# Patient Record
Sex: FEMALE | NOT HISPANIC OR LATINO | Employment: PART TIME | URBAN - METROPOLITAN AREA
[De-identification: names, ages, dates, MRNs, and addresses within clinical notes are randomized per-mention and may not be internally consistent; named-entity substitution may affect disease eponyms.]

---

## 2017-09-15 ENCOUNTER — COMMUNICATION - HEALTHEAST (OUTPATIENT)
Dept: NEUROSURGERY | Facility: CLINIC | Age: 67
End: 2017-09-15

## 2017-09-15 ENCOUNTER — AMBULATORY - HEALTHEAST (OUTPATIENT)
Dept: NEUROSURGERY | Facility: CLINIC | Age: 67
End: 2017-09-15

## 2017-09-15 DIAGNOSIS — M54.9 BACK PAIN: ICD-10-CM

## 2017-09-20 ENCOUNTER — COMMUNICATION - HEALTHEAST (OUTPATIENT)
Dept: NEUROSURGERY | Facility: CLINIC | Age: 67
End: 2017-09-20

## 2017-10-04 ENCOUNTER — HOSPITAL ENCOUNTER (OUTPATIENT)
Dept: RADIOLOGY | Facility: HOSPITAL | Age: 67
Discharge: HOME | End: 2017-10-04
Attending: NEUROLOGICAL SURGERY

## 2017-10-04 ENCOUNTER — HOSPITAL ENCOUNTER (OUTPATIENT)
Dept: MRI IMAGING | Facility: HOSPITAL | Age: 67
Discharge: HOME | End: 2017-10-04
Attending: NEUROLOGICAL SURGERY

## 2017-10-04 DIAGNOSIS — M54.9 BACK PAIN: ICD-10-CM

## 2017-10-18 ENCOUNTER — OFFICE VISIT - HEALTHEAST (OUTPATIENT)
Dept: NEUROSURGERY | Facility: CLINIC | Age: 67
End: 2017-10-18

## 2017-10-18 DIAGNOSIS — M54.16 CHRONIC LUMBAR RADICULOPATHY: ICD-10-CM

## 2017-10-18 DIAGNOSIS — M85.80 OSTEOPENIA: ICD-10-CM

## 2017-10-18 DIAGNOSIS — M41.9 SCOLIOSIS: ICD-10-CM

## 2017-10-18 ASSESSMENT — MIFFLIN-ST. JEOR: SCORE: 1196.27

## 2017-11-30 ENCOUNTER — TRANSFERRED RECORDS (OUTPATIENT)
Dept: HEALTH INFORMATION MANAGEMENT | Facility: CLINIC | Age: 67
End: 2017-11-30

## 2019-07-11 ENCOUNTER — TRANSFERRED RECORDS (OUTPATIENT)
Dept: HEALTH INFORMATION MANAGEMENT | Facility: CLINIC | Age: 69
End: 2019-07-11

## 2019-11-05 ASSESSMENT — MIFFLIN-ST. JEOR: SCORE: 1182.67

## 2019-11-06 ENCOUNTER — SURGERY - HEALTHEAST (OUTPATIENT)
Dept: GASTROENTEROLOGY | Facility: HOSPITAL | Age: 69
End: 2019-11-06

## 2019-12-06 ENCOUNTER — COMMUNICATION - HEALTHEAST (OUTPATIENT)
Dept: CARDIOLOGY | Facility: CLINIC | Age: 69
End: 2019-12-06

## 2019-12-10 ENCOUNTER — AMBULATORY - HEALTHEAST (OUTPATIENT)
Dept: CARDIOLOGY | Facility: CLINIC | Age: 69
End: 2019-12-10

## 2019-12-10 DIAGNOSIS — I48.20 CHRONIC ATRIAL FIBRILLATION (H): ICD-10-CM

## 2019-12-10 DIAGNOSIS — I48.91 ATRIAL FIBRILLATION, UNSPECIFIED TYPE (H): ICD-10-CM

## 2019-12-10 LAB
ATRIAL RATE - MUSE: 52 BPM
DIASTOLIC BLOOD PRESSURE - MUSE: NORMAL
INTERPRETATION ECG - MUSE: NORMAL
P AXIS - MUSE: 41 DEGREES
PR INTERVAL - MUSE: 180 MS
QRS DURATION - MUSE: 74 MS
QT - MUSE: 458 MS
QTC - MUSE: 413 MS
R AXIS - MUSE: 19 DEGREES
SYSTOLIC BLOOD PRESSURE - MUSE: NORMAL
T AXIS - MUSE: 31 DEGREES
VENTRICULAR RATE- MUSE: 49 BPM

## 2019-12-10 ASSESSMENT — MIFFLIN-ST. JEOR: SCORE: 1182.44

## 2019-12-13 ENCOUNTER — AMBULATORY - HEALTHEAST (OUTPATIENT)
Dept: CARDIOLOGY | Facility: CLINIC | Age: 69
End: 2019-12-13

## 2021-02-14 ENCOUNTER — HEALTH MAINTENANCE LETTER (OUTPATIENT)
Age: 71
End: 2021-02-14

## 2021-05-31 VITALS — WEIGHT: 143 LBS | HEIGHT: 67 IN | BODY MASS INDEX: 22.44 KG/M2

## 2021-06-03 VITALS
WEIGHT: 141.7 LBS | DIASTOLIC BLOOD PRESSURE: 76 MMHG | HEIGHT: 67 IN | BODY MASS INDEX: 22.24 KG/M2 | SYSTOLIC BLOOD PRESSURE: 120 MMHG | HEART RATE: 50 BPM | TEMPERATURE: 98.1 F | RESPIRATION RATE: 10 BRPM

## 2021-06-03 VITALS — WEIGHT: 140 LBS | HEIGHT: 67 IN | BODY MASS INDEX: 21.97 KG/M2

## 2021-06-04 NOTE — TELEPHONE ENCOUNTER
Zestar Study Consent Visit    Study description: ECG and PPG Study: Zestar Study      Rey Leslie a 69 y.o. female , was contacted by today to discuss participation in the Zestar study.     The patient called the Clinical Trials Office to inquire about study participation.  The consent form was reviewed with the patient.     The review of the study included:    Study purpose     Conflict of interest    Device description      Study visits    Risks of participation    Benefits (if any)    Alternatives    Voluntary participation    Confidentiality     Compensation/costs of participation    Study stipends    Injury and legal rights    The subject was queried in regards to her willingness to continue and come in for scheduled appointment. her questions were answered to her satisfaction.   The patient has given her preliminary agreement to volunteer to participate in the above noted study.     Plan: Rey Leslie will come to [facility] on 12/09/2019 to continue consent process. If she continues to agrees to participate, the study visit will be done on the same day.  she was instructed to eat as usual before coming for study visit and take medications as usual too. she was encouraged to wear comfortable clothes and shoes to the study appointment.       Kathryn Gooden RN

## 2021-06-13 NOTE — PROGRESS NOTES
"Rey is here to follow up on new MRI and scoliosis x-ray. She states she in general has lower back and hip pain and states she feels like her insides are being, \"scwooshed.\"  ALEX today is 20%  KELLY Farnsworth  "

## 2021-06-13 NOTE — PROGRESS NOTES
Diagnoses and all orders for this visit:    Scoliosis    Osteopenia    Chronic lumbar radiculopathy  -     Ambulatory referral to Spine Care          It was a pleasure to evaluate Rey in neurosurgery spine clinic today. Given her low degree overall of impairment from her scoliosis and back pain, I recommended that she continue conservative management and maintain her active and healthy lifestyle. Today, her Oswestry disability index score is 24. She is not limited overall in her ability to bike or ski. She is able to control her pain with minimal medications and with physical therapy and chiropractic manipulation.       I previously had reviewed with her the results of her most recent bone density scan in June 2015. Her overall T score is low at -1.5 and -2.1 in the femoral necks and places her at increased risk for progression of her scoliosis with her low bone density. Currently, Alejandra takes calcium and vitamin D and she doubled the dose of this after finding the results of her DEXA scan and I had previously explained to her the decline in bone health that occurs in  postmenopausal females, and I also reviewed with her how calcium and vitamin D will not be adequate to slow her bone loss to an acceptable rate. I had discussed with her the use of other medications and explained to her that the only medication that actually increase the bone density is teriparatide. Although I do not think that she currently meets indications for teriparatide, I would recommend repeating a DEXA in 2017 to ensure no significant decline in bone density. Given Rey's degree of scoliosis, she is at an extremely high risk of needing surgery in the future, and if she develops osteoporosis then improving her bone density can help slow the rate of her scoliosis progression as well as increase her chances of successful arthrodesis if any surgical intervention becomes necessary.      I reviewed Dr. Murphy's note from Endocrinology  evaluation and agree with him about Forteo- if she ever needs surgery, we would likely need to use it at that time due to her low bone density that is slightly above criteria for osteoporosis at the 2015 DEXA but would be significant if surgical intervention is needed. For now, she does not meet indications for Forteo.     Overall, Rey maintains a very happy and active lifestyle. I previously had explained to her that we can use intermittent nerve root injections to manage leg pain, if she has significant leg pain after injection of her trochanteric bursa, she can see the [facility] to be evaluated at that time, and she could be considered for a right L2-3 TFESI; and she can continue her physical therapy and chiropractic management of her back pain.      When her bad days are greater than her good days and she is no longer able to tolerate the quality of lifestyle that she desires due to pain, I told her that this will be the time that we need to consider surgery.      She is welcome back in my clinic at any time at the [facility]. I look forward to helping her surgically if ever that time should arise or to counseling her further if she ever desires.      I appreciate the opportunity to be involved in the care of this very pleasant woman.      I spent 15minutes in patient care with greater than 50% spent in counseling and/or coordination of care; breakdown: spent 15 minutes directly with patient, and 10 minutes reviewing and summarizing old records and measuring and interpreting new scoliosis xrays.                Subjective:    Patient ID: Rey Leslie is a 65 y.o. female.      HPI   Rey is here in followup of her last visit one year ago.  Her ALEX in 11/16 was 12%. It was 24% in 11/15. ALEX is 24% today 10/18/17    She continues to be very active, including walks and skiing.       Rey Leslie is a very pleasant 65 y.o. female presenting with low back pain and left leg pain and known  "scoliosis.  50% low back pain, 50% RIGHT lateral thigh pain  She got a right trochanteric bursa injection at [place] that relieved the right lateral thigh pain  No pain radiating in left leg or below knees   Exacerbated by any position too long, walking long distances.        Gait imbalance: none  Bowel/bladder changes: none  Loss of height: patient self- reports 4 inches height loss over last 3 years;   Prior DEXA scan: yes; T score -2.1, June 2015  Pain medications: no narcotics, occasional ibuprofen      Prior spine surgeries: lumbar lami in 2003      She has a notable history of what has been call \"cervical dystonia\" which is treated with Botox injections- she is unsure how much this helps her but states that she can tell a difference the last 2 weeks before a scheduled injection and knows that she needs to get an injection.         Review of Systems   Constitutional: Negative for fever, chills, activity change, appetite change, fatigue and unexpected weight change.   HENT: Negative for dental problem, mouth sores and trouble swallowing.   Eyes: Negative for visual disturbance.   Respiratory: Negative for shortness of breath.   Cardiovascular: Negative for leg swelling.   Gastrointestinal: Negative for nausea, vomiting, abdominal pain, diarrhea and constipation.   Endocrine: Negative for cold intolerance.   Genitourinary: Negative for dysuria, urgency, frequency, enuresis and difficulty urinating.   Musculoskeletal: Positive for back pain and neck stiffness. Negative for gait problem and neck pain.   Skin: Negative for color change.   Allergic/Immunologic: Negative for immunocompromised state.   Neurological: Negative for tremors, speech difficulty, weakness, numbness and headaches.   Hematological: Does not bruise/bleed easily.   Psychiatric/Behavioral: The patient is not nervous/anxious.               Past Medical History   Diagnosis Date     Low back pain        Dystonia        Scoliosis        Lumbosacral " radiculopathy at L4 10/14/2015       last DEXA in June 2015 showed femoral neck T scores of -1.5 and -2.1.                Past Surgical History   Procedure Laterality Date     Laminectomy           Shoulder arthroscopy w/ acromial repair                     Allergies   Allergen Reactions     Percocet [Oxycodone-Acetaminophen]        Sulfa (Sulfonamide Antibiotics)                      Current Outpatient Prescriptions on File Prior to Visit   Medication Sig Dispense Refill     calcium-vitamin D (CALCIUM-VITAMIN D) 500 mg(1,250mg) -200 unit per tablet Take 1 tablet by mouth 2 (two) times a day with meals.           clonazePAM (KLONOPIN) 1 MG tablet Take 1 mg by mouth 2 (two) times a day as needed for anxiety.           levothyroxine (SYNTHROID, LEVOTHROID) 75 MCG tablet Take 75 mcg by mouth daily.           metoprolol succinate (TOPROL-XL) 25 MG Take 25 mg by mouth daily.           warfarin (COUMADIN) 5 MG tablet Take 5 mg by mouth daily. Adjust dose based on INR results as directed.             No current facility-administered medications on file prior to visit.             Social History    History                 Social History     Marital Status:          Spouse Name: N/A         Number of Children: N/A     Years of Education: N/A             Occupational History     Not on file.               Social History Main Topics     Smoking status: Former Smoker     Smokeless tobacco: Not on file     Alcohol Use: Not on file     Drug Use: Not on file     Sexual Activity: Not on file               Other Topics Concern     Not on file             Social History Narrative     No narrative on file          Social History- Rey lives with her  of 50 years. They have 3 grown children. She is extremely physically active and enjoys biking and skiing.                Family History   Problem Relation Age of Onset     Hypertension Mother        Heart attack Father        Cancer Sister        No Medical Problems Brother         No Medical Problems Maternal Aunt        No Medical Problems Maternal Uncle        No Medical Problems Paternal Aunt        No Medical Problems Paternal Uncle        No Medical Problems Maternal Grandmother        No Medical Problems Maternal Grandfather        No Medical Problems Paternal Grandmother        No Medical Problems Paternal Grandfather          IMAGING:  Xrays:   Obtained at [facility] 10/4/17 and compared to 11/30/16 and compared by me with standing long cassette 6/18/15 at [facility]        Pelvic Incidence: 51  Lumbar Lordosis: 20   PI-LL mismatch: +31  Sagittal vertical alignment (C7 mary line to posterior corner of sacrum): +74mmcm  Central sacral vertical line (coronal C7 mary line to center of sacrum):54mm to left  Scoliosis: T10-L4 left 45 degree curve, 24 degrees right thoracic curve, 10 degrees left cervical curve          MRI lumbar spine: degenerative lumbar scoliosis and multilevel lumbar foraminal stenosis          Objective:    Physical Exam   Constitutional: She is oriented to person, place, and time. She appears well-developed and well-nourished. She is cooperative. No distress.   HENT:   Head: Normocephalic and atraumatic.   Eyes: Conjunctivae are normal.   Neck: No spinous process tenderness and no muscular tenderness present. No tracheal deviation present.   Neck with limited range of motion on leftward bend, flex-ext mild limited ROM   Cardiovascular: Normal rate and regular rhythm.   Pulmonary/Chest: Effort normal and breath sounds normal.   Abdominal: Soft. Bowel sounds are normal. She exhibits no distension. There is no tenderness.   Musculoskeletal:   Cervical flexion/extension ROM: mild limited rom, rotational head tilt  Lumbar flexion/extension ROM: limited due to pain on flexion past 90 degrees at hip    L thoracic prominence on Jones forward bending test,   Right shoulder elevation and leftward head shift    Neurological: She is alert and oriented to person, place, and  time. No cranial nerve deficit or sensory deficit. She displays a negative Romberg sign. Gait normal. She displays no Babinski's sign on the right side. She displays no Babinski's sign on the left side.   Reflex Scores:    .  Patellar reflexes are 2+ on the right side and 2+ on the left side.  Achilles reflexes are 2+ on the right side and 2+ on the left side.  Strength:    LEFT RIGHT      Hip Flexion 5 5   Knee Extension 5 5  Dorsiflexion 5 5  Extensor Hallucis Longus 5 5  Plantar Flexion 5 5    No Lhermitte's, No Spurling's  No Zack's   No ankle clonus      SI Joint EXAM:  LEFT RIGHT  Ramona Finger Test - -  PSIS tenderness - -  Thigh Thrust - -  DESTINY - -  Pelvic gapping - -  Pelvic compression - -  Gaenslen's - -  Sacral Thrust - -    Hip EXAM:  Axial loading/posterior - -  Impingement     Medial femoral   Impingement: - -     RIGHT (prior left) greater trochanter TTP, with deep palpation of greater trochanter she will feel pain in her right lateral thigh Skin: Skin is warm, dry and intact.   Psychiatric: She has a normal mood and affect. Her speech is normal and behavior is normal.

## 2021-06-16 PROBLEM — M54.16 CHRONIC LUMBAR RADICULOPATHY: Status: ACTIVE | Noted: 2017-10-18

## 2021-06-16 PROBLEM — T18.108A FOREIGN BODY OF ESOPHAGUS, INITIAL ENCOUNTER: Status: ACTIVE | Noted: 2019-11-05

## 2021-06-28 NOTE — PROGRESS NOTES
Progress Notes by Janee Lui at 12/10/2019  8:00 AM     Author: Janee Lui Service: -- Author Type: Patient Access    Filed: 12/11/2019  7:57 AM Encounter Date: 12/10/2019 Status: Signed    : Carlos Enrique Loredo MD (Physician)    Related Notes: Original Note by Janee Lui (Patient Access) filed at 12/10/2019 10:31 AM           [facility] Study Inclusion / Exclusion Criteria  Protocol Version 4.0 ([id number])    Inclusion Criteria    Yes No Criteria # Subject must meet all inclusion criteria:      [x]    []  1 At least 18 years old for NSR and 22 years old for Non-NSR. Inclusive for both cohorts, at time of screening, with no upper limit on age.        [x]      []  2 To be enrolled as a non-NSR subject the volunteer must have one of the following conditions: Permanent/Persistent AF, Hx of paroxysmal AF, Hx of High-rate AF, AF + rate control medication, Hx Atrial Flutter, PVC burden >1%, Frequent PACs, Hx BBB, Hx 2nd degree block (any type), Hx Bigeminy/Trigeminy/Quadgeminy, Hx Tachycardia. For subjects with any of the following diagnoses, the condition must be present at the time of screening:   ? Permanent/persistent AF  ? PVC [facility]  ? Frequent PACs   Note: If not present at screening, subjects may not be enrolled as a non-NSR subject or NSR subject.         3  [x] N/A HE site For Subjects to be enrolled as NSR they must be in NSR at the time of screening as determined by the investigator. For subjects ?65 years old with PVC burden of ?1% or infrequent PACs (<3 ectopic beats in 30 seconds), they may qualify as individuals in the NSR cohort as long as they don't have a medical history/diagnosis of significant ectopic burden. For subjects ? 66 years old with PVC burden > 1% but ?10%, they may qualify as individuals in the NSR cohort as long as they don't have a medical history/diagnosis of significant ectopic burden.    [x]  []  4 Able to read and understand a written ICF    [x]  []  5 Willing and  able to participate in the study procedures and comply with its restrictions    [x]  []  6 Able to communicate effectively with study staff as well as understand and follow directions    All must be Yes    Exclusion Criteria-all must be no  Yes No Criteria # Subject must not meet any exclusion criteria:    []  [x]  1 Physical disability that prevents safe and adequate testing.   []  [x]  2 Pregnant women or women planning to become pregnant   []  [x]  3 Any acute illness or condition that may interfere with study procedures (e.g. cough, fever, sore throat, headache, sunburn, etc.)   []  [x]  4 Clinically significant hand tremors, as judged by the Investigator   []  [x]  5 Resting hypertension with systolic blood pressure ?161 mmHg or diastolic blood pressure ?101 mmHg (if at least 2 of 3 measurements meet this criteria)   []  [x]  6 Subjects with a pacemaker or an automated implantable cardioverter- defibrillator (AICD)   []  [x]  7 Acute myocardial infarction (MI) within 90 days from the screening visit   []  [x]  8 Other cardiovascular disease that increases the risk to the subject or would render the data uninterpretable in the opinion of the Investigator (e.g., recent or ongoing unstable angina, significant valvular heart disease or chronic heart failure, myocarditis or pericarditis)    []  [x]  9 Acute pulmonary embolism, pulmonary infarction, or deep vein thrombosis within 90 days from the screening visit    []  [x]  10 Stroke or transient ischemic attack within 90 days from the screening visit    []  [x]  11 Known untreated medical conditions as determined by the Investigator, such as but not limited to significant anemia, important electrolyte imbalance and untreated or uncontrolled thyroid disease.    []  [x]  12 Any history of wrist surgery with scarring in the area of the sensor location on the wrist where the subject will be wearing the watch;    []  [x]  13 Open wound(s) on the wrist and forearm where  the subject will be wearing the watch    []  [x]  14 Severe symptomatic (or active) overly dry/injured skin, skin disorders, or allergic skin reactions such as eczema, rosacea, impetigo, dermatomyositis or allergic contact dermatitis on wrist and locations where the electrodes will be placed (e.g. chest, forearms, stomach), as determined by the investigator.    []  [x]  15 Tattoos, scars or moles in the area of the sensor location on the wrist where the subject will be wearing the watch    []  [x]  16 Device wearing Wrist circumference ? 129 mm or ? 246 mm    []  [x]  17 Known significant sensitivity to medical adhesives or isopropyl alcohol (for ECG electrode placement)    []  [x]  18 Known allergy or sensitivity to fluorocarbon-based synthetic rubber, such as contact dermatitis with fluoroelastomer bands primarily used in wrist worn fitness devices    []  [x]  19 Subjects with any Medical History, Physical exam, vital sign or any other study procedure finding/assessment that in the opinion of the investigator could compromise subject safety during study participation or interfere with the study integrity and/or the accurate assessment of the study objectives    []  [x]  20 Subject works for a company that develops or sells medical and/or fitness devices (e.g., ECG monitors, wearable fitness bands, sleep monitors, etc.) or are technology journalists (e.g., professional bloggers, TV, magazine, newspaper reporters, etc.)    []  [x]  21 Weight > 181 kg for subjects using the stationary bike and/or treadmill. Weight of ?138 kg for NSR subjects.    []  [x]  22 Subject is employed in shift work, or otherwise does not maintain a reasonably consistent day/night schedule (e.g. Subjects who go to bed after 4am).    []  [x]  23 Overnight travel planned during data collection nights    []  [x]  24 Non-NSR subjects should not have partaken in strenuous physical activity within 12 hours prior to screening    []  [x]  25 Non-NSR  subjects with Atrial fibrillation categories: Subjects taking Class 1 or Class 3 antiarrhythmic agents such as the following may not take part in any stage of the study: amiodarone, sotalol, dronedarone, ibutilide, dofetilide, propafenone, quinidine, procainamide, disopyramide, flecainide (Subjects taking class 2, 4 or 5 antiarrhythmic agents may take part the study).    []  [x]  26 Subjects who have both a history of paroxysmal AF and a Callahan skin type measurement of VI    []  [x]  27 Subjects who have missing index fingers on both hands      Subject has met all inclusion criteria and no exclusion criteria have been met.   Subject is ready to fully enrolled in the Zestar study.    Janee Lui

## 2021-06-28 NOTE — PROGRESS NOTES
Progress Notes by Anh Jones at 12/13/2019 10:30 AM     Author: Anh Jones Service: -- Author Type: Patient Access    Filed: 12/13/2019 10:30 AM Encounter Date: 12/13/2019 Status: Signed    : Anh Jones (Patient Access)         Zestar Study Device Return    Rey Leslie returned all the devices for the Zestar study.  Rey Leslie denies medication changes or adverse events since last visit.    Rey Leslie has now completed their participation in the Zestar study.   Thank you for your gift of participation.    Anh Jones

## 2021-06-28 NOTE — PROGRESS NOTES
Progress Notes by Janee Lui at 12/10/2019  8:00 AM     Author: Janee Lui Service: -- Author Type: Patient Access    Filed: 12/10/2019 10:31 AM Encounter Date: 12/10/2019 Status: Signed    : Janee Lui (Patient Access)            Zestar Study Consent Visit    Study description: ECG and PPG Study: Zestar Study      Note time seated: 8:09 am     Rey Leslie a 69 y.o. female , was seen in [facility] today to discuss participation in the Zestar study.   The consent discussion began on 6 Dec 2019.  Please refer to phone call note from Kathryn Gooden for more details.  The consent form was reviewed with the patient.     The review of the study included:    Study purpose     Conflict of interest    Device description      Study visits    Risks of participation    Benefits (if any)    Alternatives    Voluntary participation    Confidentiality     Compensation/costs of participation    Study stipends    Injury and legal rights    The subject was provided time to review the consent form and consider participation. her questions were answered to her satisfaction.   The patient has voluntarily agreed to participate in the above noted study.     The consent form version 25 Nov 2019 and HIPAA form version 11 Jun 2019 was signed 12/10/19 at 8:22 am     The subject was provided with a copy of the consent form and HIPAA. A copy of the signed forms was forwarded to medical records.    No study procedures were done prior to Rey Leslie providing informed consent.     Janee Lui    Subject Restrictions During Study -Confirmed with Subject prior to any study procedures completed    Restrictions on jewelry, recreational drugs, caffeine, and exercise few days prior and during study.   1. Subjects should not consume excessive amount of caffeine (6 or more 8-oz cups of coffee, or more than 570 mg of caffeine from energy drinks, pills or similar substance) during their participation in the study.   2. Subjects should  "not consume excessive amount of alcohol for the duration of their participation in the study. A typical moderate amount is allowed during stage 3.   3. Subjects should not take any recreational drugs (including, but not limited to methamphetamines, cocaine, opioids, cannabis, LSD) for the duration of their participation in the study.   4. Subjects should not wear underwire bra or jewelry during the in-lab study (to not interfere with electrode placement and ECG data recordings).   5. Subject will not be permitted to have their cell phone or any electronic recording device on or with them during the in-lab test session(s).   6. Subjects under 22 years old will not be permitted to take ECG recordings through the ECG kash on the wrist-worn devices.     For study stage 3 only   1. Subjects should only do high intensity exercise (e.g. sprinting, heavy lifting, etc.) in the morning upon awakening or else not at all   2. Subjects should abstain from swimming during the time of the study   3. Subjects should only shower in the morning upon awakening (or else not at all)   4. Female subjects are strongly suggested to wear non-underwire bras throughout this stage of the study     Janeehair Lui      Study Data collections   Vitals  (TPBP)     Vitals:    12/10/19 0825 12/10/19 0827 12/10/19 0828   BP: 130/74 126/77 120/76   Patient Site: Right Arm Right Arm Right Arm   Patient Position: Sitting Sitting Sitting   Cuff Size: Adult Regular Adult Regular Adult Regular   Pulse: (!) 48 (!) 51 (!) 50   Resp: 10     Temp: 98.1  F (36.7  C)     Weight:   141 lb 11.2 oz (64.3 kg)   Height:   5' 6.5\" (1.689 m)      VS taken after 5 min rest     MAP 1    90  MAP 2      91   MAP 3             94        Body mass index is 22.53 kg/m .  female  1950  69 y.o.      Note time patient placed in supine position: 8:31 am     Ethnicity   []   or    [x]  Not  or   Race   []   or    []  "   []  Black or   []   or Other   [x]  White  Physical Activity Level  per subject report:   []  0- Extremely Inactive []  1- Sedentary []  2- Moderately Active  [x]  3- Vigorously Active []  4- Extremely Active  Trained Athlete   [] Yes  [x] No     Callahan's' Skin type   [] Type 1 [] Type 2 [x] Type 3    [] Type 4 [] Type 5 [] Type 6    Subject participated in previous ECG study at [facility]: [] Yes    [x] No    Past Medical History:   Diagnosis Date   ? Dystonia 25 yrs ago   ? Low back pain    ? Lumbosacral radiculopathy at L4 10/14/2015   ? Scoliosis 23 yrs ago       HISTORY OF HEART RHYTHM ABNORMALITIES (check only group subject is 'randomized[' to-only 1)  []  Group 1: History of paroxysmal atrial fibrillation (no excluded medications)  []  Group 2: History of paroxysmal atrial fibrillation (on excluded medications)    []  Group 3: History of high-rate atrial fibrillation   [x]  Group 4: History of atrial fibrillation with rate control medications    []  Group 5: Permanent/persistent atrial fibrillation    []  Group 6: history of atrial flutter  []  Group 7: Frequent PVCs  []  Group 8: Frequent PACs  []  Group 9: BBB (left or right)  []  Group 10: History of 2nd Heart Block (any type)  []  Group 11: History of bigeminy, trigeminy, and/or quadgeminy  []  Group 12: History of tachycardia     Special interest allergies: active allergic skin reactions  Allergies   Allergen Reactions   ? Percocet [Oxycodone-Acetaminophen]    ? Sulfa (Sulfonamide Antibiotics)        Current Outpatient Medications:   ?  alendronate (FOSAMAX) 70 MG tablet, Take 70 mg by mouth every 7 days. Take in the morning on an empty stomach with a full glass of water 30 minutes before food, Disp: , Rfl:   ?  biotin 10,000 mcg cap, Take 10,000 mcg by mouth., Disp: , Rfl:   ?  calcium-vitamin D (CALCIUM-VITAMIN D) 500 mg(1,250mg) -200 unit per tablet, Take 1 tablet by mouth 2 (two) times a day  "with meals., Disp: , Rfl:   ?  clonazePAM (KLONOPIN) 1 MG tablet, Take 1 mg by mouth 2 (two) times a day as needed for anxiety., Disp: , Rfl:   ?  levothyroxine (SYNTHROID, LEVOTHROID) 75 MCG tablet, Take 75 mcg by mouth daily., Disp: , Rfl:   ?  metoprolol succinate (TOPROL-XL) 25 MG, Take 25 mg by mouth daily., Disp: , Rfl:   ?  warfarin (COUMADIN) 5 MG tablet, Take 5 mg by mouth daily. Adjust dose based on INR results as directed., Disp: , Rfl:       10-sec 12-lead ECG & 30-sec 12-lead ECG rhythm strip done; reviewed by & PE done by Jerri Fregoso   Subject Questionnaire    OCCUPATION:    Predominately works outdoors  [] Yes    [x] No      Hours/week spent outdoors (total, not only for work): 15  Frequently participates in \"hand intensive\" activities [x] Yes [] No  Caffeine  []  Never  [] Occasionally        []  Daily (1 cup/day)     [x]  Daily (>1 cup/day)    Alcohol   []  Never  [] Light (drink or 2 occasionally) [x]  Moderate (a drink or 2 almost daily)   []  Occasional-heavy (more than a few drinks <2x / month)  [] Heavy (more than a few drinks >2x / month)    Tobacco/nicotine  [x]  Never  [] Rarely  []  Frequently/ Daily     Mattress Information    Mattress type:  []  Memory foam [] Gel  [x] Innerspring (coil)  [] Airbed  [] Waterbed [] Shikibuton  [] Hybrid  [] No mattress  [] Other (comment):    Mattress foundation   [] Mattress on floor/ground    [] Mattress on foundation/box spring on floor/ground  [x] Mattress on foundation/box spring on bed frame  [] Mattress on tatami on floor/ground  [] Other (comment):    Mattress topper   [] No mattress topper  [] Pillow top  [x] Foam top - flat style  [] Foam top - \"egg crate\" style  [] Other (comment):    Co-sleeper    [x]  Yes  []  No    CPAP use   [] Yes  [x] No      Dominant hand [] left  [x] right [] ambidextrous  Preferred Wrist to wear band on   [x]  left   []  right   Were screening day & study day: [x]  same [] different   Same: wrist " circumference:      147  mm  Device wearing wrist skin fold thickness:       2.0  mm  Wrist Band Size:     [x]  Flush Fit S/M  []  Non-Flush Fit S/M   []  Flush Fit M/L  []  Non-Flush Fit M/L  []  Flush Fit XL  []  Non-Flush Fit XL    Preferred/natural band notch: 3  Secure band notch: 3  Crown orientation:  []  left   [x]  right  Device wearing wrist hairiness:     [x]  Light []  Medium       []  Heavy  Spectophotometer   L A B   Reading #1  63.84  9.97 19.00    Reading #2  62.70 10.77  19.82    Reading #3 60.91 9.84 19.16     Pregnancy test    [] WOCBP (age <55 yrs, no tubal ligation, no hysterectomy)    [x] n/a male or female not child bearing potential  For Stage 2: subject will use exercise bike for exercise portion of the study  Room Temperature: 20 C  CS Laptop ID: [id number]  CS Cam ID:[id number]  Device Set ID:[id number]  Wrist Device ID:[id number]  Subject has now completed their in-house participation in the Zestar study. Subject will complete Stage 3 at home for the next 3 days and return the equipment on 13 Dec 2019.  Janee Lui

## 2021-06-28 NOTE — PROGRESS NOTES
Progress Notes by Jerri Fregoso CNP at 12/10/2019  8:00 AM     Author: Jerri Fregoso CNP Service: -- Author Type: Nurse Practitioner    Filed: 12/10/2019 10:31 AM Encounter Date: 12/10/2019 Status: Signed    : Jerri Fregoso CNP (Nurse Practitioner)        Zestar Study    Physical Examination  For abnormal findings, please evaluate if the finding is Clinically Significant (by 'CS') or Not Clinically Significant (by 'NCS')  General Appearance Normal  Head and Neck  Normal  Lungs    Normal  Cardiovascular  Abnormal- NCS, bradycardia  Abdomen   Normal  Musculoskeletal/Extremities Normal   Lymph Nodes  Normal  Skin    Normal  Neurological   Normal   Tremor absent     If present, evaluate severity on 1-10 scale    Jerri Fregoso, NP

## 2021-09-19 ENCOUNTER — HEALTH MAINTENANCE LETTER (OUTPATIENT)
Age: 71
End: 2021-09-19

## 2021-12-30 ENCOUNTER — HOSPITAL ENCOUNTER (EMERGENCY)
Facility: HOSPITAL | Age: 71
Discharge: HOME | End: 2021-12-30
Attending: STUDENT IN AN ORGANIZED HEALTH CARE EDUCATION/TRAINING PROGRAM | Admitting: STUDENT IN AN ORGANIZED HEALTH CARE EDUCATION/TRAINING PROGRAM
Payer: MEDICARE

## 2021-12-30 ENCOUNTER — APPOINTMENT (OUTPATIENT)
Dept: RADIOLOGY | Facility: HOSPITAL | Age: 71
End: 2021-12-30
Attending: STUDENT IN AN ORGANIZED HEALTH CARE EDUCATION/TRAINING PROGRAM
Payer: MEDICARE

## 2021-12-30 VITALS
WEIGHT: 140 LBS | TEMPERATURE: 97.5 F | HEART RATE: 82 BPM | OXYGEN SATURATION: 96 % | DIASTOLIC BLOOD PRESSURE: 91 MMHG | BODY MASS INDEX: 22.26 KG/M2 | SYSTOLIC BLOOD PRESSURE: 149 MMHG | RESPIRATION RATE: 20 BRPM

## 2021-12-30 DIAGNOSIS — I48.20 CHRONIC ATRIAL FIBRILLATION (H): ICD-10-CM

## 2021-12-30 PROBLEM — M81.0 OSTEOPOROSIS: Status: ACTIVE | Noted: 2018-01-18

## 2021-12-30 PROBLEM — S93.491A SPRAIN OF ANTERIOR TALOFIBULAR LIGAMENT OF RIGHT ANKLE: Status: ACTIVE | Noted: 2019-06-05

## 2021-12-30 PROBLEM — K57.90 DIVERTICULOSIS: Status: ACTIVE | Noted: 2018-11-09

## 2021-12-30 PROBLEM — I10 ESSENTIAL HYPERTENSION: Status: ACTIVE | Noted: 2018-07-17

## 2021-12-30 PROBLEM — R14.0 ABDOMINAL BLOATING: Status: ACTIVE | Noted: 2018-11-09

## 2021-12-30 PROBLEM — R53.83 TIRED: Status: ACTIVE | Noted: 2019-01-01

## 2021-12-30 PROBLEM — R63.4 WEIGHT LOSS: Status: ACTIVE | Noted: 2020-08-13

## 2021-12-30 PROBLEM — I77.810 ASCENDING AORTA DILATATION (H): Status: ACTIVE | Noted: 2018-04-10

## 2021-12-30 PROBLEM — E78.00 PURE HYPERCHOLESTEROLEMIA: Status: ACTIVE | Noted: 2020-07-08

## 2021-12-30 LAB
ALBUMIN SERPL-MCNC: 3.8 G/DL (ref 3.5–5)
ALP SERPL-CCNC: 100 U/L (ref 45–120)
ALT SERPL W P-5'-P-CCNC: 19 U/L (ref 0–45)
ANION GAP SERPL CALCULATED.3IONS-SCNC: 8 MMOL/L (ref 5–18)
AST SERPL W P-5'-P-CCNC: 22 U/L (ref 0–40)
ATRIAL RATE - MUSE: 357 BPM
BASOPHILS # BLD AUTO: 0.1 10E3/UL (ref 0–0.2)
BASOPHILS NFR BLD AUTO: 1 %
BILIRUB SERPL-MCNC: 0.4 MG/DL (ref 0–1)
BUN SERPL-MCNC: 10 MG/DL (ref 8–28)
CALCIUM SERPL-MCNC: 8.9 MG/DL (ref 8.5–10.5)
CHLORIDE BLD-SCNC: 108 MMOL/L (ref 98–107)
CO2 SERPL-SCNC: 25 MMOL/L (ref 22–31)
CREAT SERPL-MCNC: 0.67 MG/DL (ref 0.6–1.1)
DIASTOLIC BLOOD PRESSURE - MUSE: NORMAL MMHG
EOSINOPHIL # BLD AUTO: 0.1 10E3/UL (ref 0–0.7)
EOSINOPHIL NFR BLD AUTO: 2 %
ERYTHROCYTE [DISTWIDTH] IN BLOOD BY AUTOMATED COUNT: 12.7 % (ref 10–15)
GFR SERPL CREATININE-BSD FRML MDRD: >90 ML/MIN/1.73M2
GLUCOSE BLD-MCNC: 110 MG/DL (ref 70–125)
HCT VFR BLD AUTO: 42.1 % (ref 35–47)
HGB BLD-MCNC: 13.8 G/DL (ref 11.7–15.7)
HOLD SPECIMEN: NORMAL
IMM GRANULOCYTES # BLD: 0 10E3/UL
IMM GRANULOCYTES NFR BLD: 0 %
INR PPP: 1.27 (ref 0.9–1.15)
INTERPRETATION ECG - MUSE: NORMAL
LYMPHOCYTES # BLD AUTO: 1.6 10E3/UL (ref 0.8–5.3)
LYMPHOCYTES NFR BLD AUTO: 26 %
MAGNESIUM SERPL-MCNC: 1.9 MG/DL (ref 1.8–2.6)
MCH RBC QN AUTO: 30 PG (ref 26.5–33)
MCHC RBC AUTO-ENTMCNC: 32.8 G/DL (ref 31.5–36.5)
MCV RBC AUTO: 92 FL (ref 78–100)
MONOCYTES # BLD AUTO: 0.9 10E3/UL (ref 0–1.3)
MONOCYTES NFR BLD AUTO: 14 %
NEUTROPHILS # BLD AUTO: 3.6 10E3/UL (ref 1.6–8.3)
NEUTROPHILS NFR BLD AUTO: 57 %
NRBC # BLD AUTO: 0 10E3/UL
NRBC BLD AUTO-RTO: 0 /100
P AXIS - MUSE: NORMAL DEGREES
PLATELET # BLD AUTO: 350 10E3/UL (ref 150–450)
POTASSIUM BLD-SCNC: 3.7 MMOL/L (ref 3.5–5)
PR INTERVAL - MUSE: NORMAL MS
PROT SERPL-MCNC: 7 G/DL (ref 6–8)
QRS DURATION - MUSE: 92 MS
QT - MUSE: 378 MS
QTC - MUSE: 441 MS
R AXIS - MUSE: 22 DEGREES
RBC # BLD AUTO: 4.6 10E6/UL (ref 3.8–5.2)
SODIUM SERPL-SCNC: 141 MMOL/L (ref 136–145)
SYSTOLIC BLOOD PRESSURE - MUSE: NORMAL MMHG
T AXIS - MUSE: -22 DEGREES
TROPONIN I SERPL-MCNC: <0.01 NG/ML (ref 0–0.29)
TSH SERPL DL<=0.005 MIU/L-ACNC: 0.7 UIU/ML (ref 0.3–5)
VENTRICULAR RATE- MUSE: 82 BPM
WBC # BLD AUTO: 6.4 10E3/UL (ref 4–11)

## 2021-12-30 PROCEDURE — 85025 COMPLETE CBC W/AUTO DIFF WBC: CPT | Performed by: STUDENT IN AN ORGANIZED HEALTH CARE EDUCATION/TRAINING PROGRAM

## 2021-12-30 PROCEDURE — 83735 ASSAY OF MAGNESIUM: CPT | Performed by: STUDENT IN AN ORGANIZED HEALTH CARE EDUCATION/TRAINING PROGRAM

## 2021-12-30 PROCEDURE — 80053 COMPREHEN METABOLIC PANEL: CPT | Performed by: STUDENT IN AN ORGANIZED HEALTH CARE EDUCATION/TRAINING PROGRAM

## 2021-12-30 PROCEDURE — 84443 ASSAY THYROID STIM HORMONE: CPT | Performed by: STUDENT IN AN ORGANIZED HEALTH CARE EDUCATION/TRAINING PROGRAM

## 2021-12-30 PROCEDURE — 36415 COLL VENOUS BLD VENIPUNCTURE: CPT | Performed by: STUDENT IN AN ORGANIZED HEALTH CARE EDUCATION/TRAINING PROGRAM

## 2021-12-30 PROCEDURE — 84484 ASSAY OF TROPONIN QUANT: CPT | Performed by: STUDENT IN AN ORGANIZED HEALTH CARE EDUCATION/TRAINING PROGRAM

## 2021-12-30 PROCEDURE — 93005 ELECTROCARDIOGRAM TRACING: CPT | Performed by: STUDENT IN AN ORGANIZED HEALTH CARE EDUCATION/TRAINING PROGRAM

## 2021-12-30 PROCEDURE — 82040 ASSAY OF SERUM ALBUMIN: CPT | Performed by: STUDENT IN AN ORGANIZED HEALTH CARE EDUCATION/TRAINING PROGRAM

## 2021-12-30 PROCEDURE — 85610 PROTHROMBIN TIME: CPT | Performed by: STUDENT IN AN ORGANIZED HEALTH CARE EDUCATION/TRAINING PROGRAM

## 2021-12-30 PROCEDURE — 99285 EMERGENCY DEPT VISIT HI MDM: CPT | Mod: 25

## 2021-12-30 PROCEDURE — 71046 X-RAY EXAM CHEST 2 VIEWS: CPT

## 2021-12-30 ASSESSMENT — ENCOUNTER SYMPTOMS
PALPITATIONS: 1
NERVOUS/ANXIOUS: 1

## 2021-12-30 NOTE — ED TRIAGE NOTES
Presents with palpitations. Hx of a-fib. On blood thinner. Denies chest pain, shortness of breath or dizzinesss, falls or other problems. Onset of symptoms was last night.

## 2021-12-30 NOTE — ED NOTES
Patient has history of afib, reports feeling palpitations and weird feeling in her throat.  She states she has had this feeling on and off for some time, but became more consistent since yesterday.  Patient denies pain.

## 2021-12-30 NOTE — DISCHARGE INSTRUCTIONS
Please continue your medications as you have previously.  You may contact your INR nurse for further instructions regarding your INR however it seems to be increasing appropriately.    Please take your metoprolol as previously scheduled.  You should consider trying to get in to see your primary physician sooner if possible or if symptoms persist.

## 2021-12-30 NOTE — ED PROVIDER NOTES
EMERGENCY DEPARTMENT ENCOUNTER      NAME: Rey Leslie  AGE: 71 year old female  YOB: 1950  MRN: [id number]  EVALUATION DATE & TIME: 12/30/2021 10:22 AM    PCP: Diane Solorio    ED PROVIDER: Elieser Salazar M.D.      Chief Complaint   Patient presents with     Palpitations         FINAL IMPRESSION:  Atrial Fibrillation  Palpitations    ED COURSE & MEDICAL DECISION MAKING:    Pertinent Labs & Imaging studies reviewed. (See chart for details)  71 year old female presents to the Emergency Department for evaluation of palpitations.  Patient with what is most likely her afib.  Her EKG is slow afib without any evidence of rvr.  Work up is negative.  Troponin is negative and symptoms have been present for quite some time.  INR is low and she has just increased her coumadin a few days ago.  She recently had a holter monitor and is scheduled to see her primary next wed after an echo on Tuesday.  Patient has proper outpateint work up started.  Will hold off on any changes to medciation at this time.  Will have her call her INR clinic for adjustments to coumadin and await work up with primary to alter her metoprolol as I think her symptoms are generally minimal.    10:21 AM I met with the patient and performed my initial exam.  I discussed the plan for care and the patient is agreeable with this plan. PPE: Provider wore gloves, N95 mask, eye protection.    10:45 AM I rechecked and updated the patient.   12:47 PM I discussed the plan for discharge with the patient, and patient is agreeable. We discussed supportivecares at home and reasons for return to the ER including new or worsening symptoms - all questions and concerns addressed. Patient to be discharged by RN.     At the conclusion of the encounter I discussed the results of all of the tests and the disposition. The questions were answered. The patient or family acknowledged understanding and was agreeable with the care plan.           MEDICATIONS GIVEN  IN THE EMERGENCY:  Medications - No data to display    NEW PRESCRIPTIONS STARTED AT TODAY'S ER VISIT  New Prescriptions    No medications on file          =================================================================    HPI    Patient information was obtained from: the patient    Use of : N/A        Rey Leslie is a 71 year old female with a pertinent history of ascending aorta dilation, hypertension, mitral valve prolapse, atrial fibrillation, high cholesterol who presents to this ED by personal vehicle for evaluation of palpitations.    Per chart review, the patient presented to [facility] on 12/28/2021 to hookup a heart monitor for her palpitations and atrial fibrillation.  The patient's INR was 1.1.    Of note, the patient called her Nurse this AM and was instructed to present to the ED.     The patient reports off/on heart palpitations over the last few weeks with her most recent episode last night.  She further explained that her palpitations resolved and she slept well, but then started again this AM causing her to present.  She admitted she is nervous in the ED so it is hard to tell if she is having true palpitations currently.    Of note, she just finished her Holter monitor.     The patient denies chest pain and any other symptoms or complaints at this time.     MHx: The patient has a history of atrial fibrillation with only ~5 episodes in her over 10 year history.  History of mitral valve prolapse.       REVIEW OF SYSTEMS   Review of Systems   Cardiovascular: Positive for palpitations (off/on). Negative for chest pain.   Psychiatric/Behavioral: The patient is nervous/anxious.    All other systems reviewed and are negative.       PAST MEDICAL HISTORY:  No past medical history on file.    PAST SURGICAL HISTORY:  Past Surgical History:   Procedure Laterality Date     LAMINECTOMY  15 yrs ago     AK ESOPHAGOGASTRODUODENOSCOPY TRANSORAL DIAGNOSTIC N/A 11/6/2019    Procedure:  ESOPHAGOGASTRODUODENOSCOPY (EGD);  Surgeon: Wallace Cooper MD;  [facility]: [facility];  Service: Gastroenterology     SHOULDER ARTHROSCOPY W/ ACROMIAL REPAIR Bilateral 10 yrs ago           CURRENT MEDICATIONS:    biotin 1000 MCG TABS tablet  Calcium Carbonate-Vitamin D (CALCIUM + D PO)  CLONAZEPAM PO  LEVOTHYROXINE SODIUM PO  METOPROLOL TARTRATE PO  WARFARIN SODIUM PO        ALLERGIES:  Allergies   Allergen Reactions     Sulfa Drugs        FAMILY HISTORY:  Family History   Problem Relation Age of Onset     Hypertension Mother      Coronary Artery Disease Father      Cancer Sister      No Known Problems Brother      No Known Problems Maternal Aunt      No Known Problems Maternal Uncle      No Known Problems Paternal Aunt      No Known Problems Paternal Uncle      No Known Problems Maternal Grandmother      No Known Problems Maternal Grandfather      No Known Problems Paternal Grandmother      No Known Problems Paternal Grandfather        SOCIAL HISTORY:   Social History     Socioeconomic History     Marital status:      Spouse name: Not on file     Number of children: Not on file     Years of education: Not on file     Highest education level: Not on file   Occupational History     Not on file   Tobacco Use     Smoking status: Former Smoker     Packs/day: 0.00     Smokeless tobacco: Not on file   Substance and Sexual Activity     Alcohol use: Not on file     Drug use: Not on file     Sexual activity: Not on file   Other Topics Concern     Not on file   Social History Narrative     Not on file     Social Determinants of Health     Financial Resource Strain: Not on file   Food Insecurity: Not on file   Transportation Needs: Not on file   Physical Activity: Not on file   Stress: Not on file   Social Connections: Not on file   Intimate Partner Violence: Not on file   Housing Stability: Not on file       VITALS:  BP (!) 176/88   Pulse 88   Temp 97.5  F (36.4  C) (Oral)   Resp 20   Wt 63.5 kg (140 lb)   SpO2  95%   BMI 22.26 kg/m        PHYSICAL EXAM    Constitutional: Well developed, Well nourished, NAD, GCS 15  HENT: Normocephalic, Atraumatic, Bilateral external ears normal, Oropharynx normal, mucous membranes moist, Nose normal. Neck-  Normal range of motion, No tenderness, Supple, No stridor.  Eyes: PERRL, EOMI, Conjunctiva normal, No discharge.   Respiratory: Normal breath sounds, No respiratory distress, No wheezing, Speaks full sentences easily. No cough.  Cardiovascular: Normal heart rate, No murmurs, No rubs, No gallops. Chest wall nontender. Irregularly irregular rhythm.   GI:Soft, No tenderness, No masses, No flank tenderness. No rebound or guarding.   Musculoskeletal: 2+ DP pulses. No edema.No cyanosis, No clubbing. Good range of motion in all major joints. No tenderness to palpation or major deformities noted.   Integument: Warm, Dry, No erythema, No rash. No petechiae.   Neurologic: Alert & oriented x 3,  CN 3-12 intact Normal motor function, Normal sensory function, No focal deficits noted. Normal gait. Normal finger to nose bilaterally  Psychiatric: Affect normal, Judgment normal, Mood normal. Cooperative.          LAB:  All pertinent labs reviewed and interpreted.  Labs Ordered and Resulted from Time of ED Arrival to Time of ED Departure - No data to display    RADIOLOGY:  Reviewed all pertinent imaging. Please see official radiology report.  Chest XR,  PA & LAT    (Results Pending)       EKG:    Performed at: 10:10:16    Impression: Nonspecific ST and T wave abnormality in the inferiorly - laterally.     Rate: 82 BPM  Rhythm: atrial fibrillation  Axis: *  22  -22  FL Interval: * ms  QRS Interval: 92 ms  QTc Interval: 441 ms    I have independently reviewed and interpreted the EKG(s) documented above.    PROCEDURES:   None      Timmy DEL TORO, am serving as a scribe to document services personally performed by Dr. Elieser Salazar based on my observation and the provider's statements to me. Elieser DEL TORO  MD Martin attest that Timmy Stauffer is acting in a scribe capacity, has observed my performance of the services and has documented them in accordance with my direction.    Elieser Salazar M.D.  Emergency Medicine  [facility] EMERGENCY DEPARTMENT  [address] [place] [id number]  [phone]  Dept: [phone]     Elieser Salazar MD  12/31/21 1840       Elieser Salazar MD  01/17/22 [id number]

## 2022-01-19 ENCOUNTER — OFFICE VISIT (OUTPATIENT)
Dept: NEUROLOGY | Facility: CLINIC | Age: 72
End: 2022-01-19
Payer: MEDICARE

## 2022-01-19 VITALS — SYSTOLIC BLOOD PRESSURE: 120 MMHG | DIASTOLIC BLOOD PRESSURE: 66 MMHG | HEART RATE: 76 BPM

## 2022-01-19 DIAGNOSIS — M41.26 OTHER IDIOPATHIC SCOLIOSIS, LUMBAR REGION: ICD-10-CM

## 2022-01-19 DIAGNOSIS — Z92.29 S/P BOTOX INJECTION: ICD-10-CM

## 2022-01-19 DIAGNOSIS — G24.3 CERVICAL DYSTONIA: Primary | ICD-10-CM

## 2022-01-19 DIAGNOSIS — M41.22 OTHER IDIOPATHIC SCOLIOSIS, CERVICAL REGION: ICD-10-CM

## 2022-01-19 PROCEDURE — 99205 OFFICE O/P NEW HI 60 MIN: CPT | Mod: GC | Performed by: PSYCHIATRY & NEUROLOGY

## 2022-01-19 NOTE — PATIENT INSTRUCTIONS
1. Attempt frontalis test with PMR physician  - Inject unilaterally in frontalis muscle and have patient take a picture at 3-4 weeks to assess for asymmetry (preferably with frontalis activation)  - This can assess for Onabotulinum toxin A resistance if the asymmetry is not appreciated   - If resistance is present, consider Onabotulinum toxin B (Myobloc)    2. Continue consistent physical exercise    3. Patient would like to attempt weaning Clonazepam to assess for efficacy  - Wean by 1/2 tablet every 7 days  - Take 1/2 tab in AM and 1 full tab in PM x7 days, then take 1/2 tab twice daily x7 days, then take 1/2 tab in the PM only x7 days, then attempt to stop  - If you develop worsening of symptoms on lower doses, then stop weaning off and keep the current medication regimen  - If we find that Clonazepam is quite effective with minimal side effects in the future, then consider adding a 1/2 tablet or 1 tablet in the afternoon/midday

## 2022-01-19 NOTE — LETTER
2022         RE: Rey Leslie  [address]  [address]  [facility] [address]        Dear Colleague,    Thank you for referring your patient, Rey Leslie, to the [facility]. Please see a copy of my visit note below.    Department of Neurology  Movement Disorders Division   Initial Clinic Evaluation     Patient: Rey Leslie   MRN: [id number]   : 1950   Date of Visit: 2022     CC: Cervical dystonia     History of Present Illness:  Ms. Leslie is a 71 year old woman with PMH significant for severe scoliosis of the spine with longitudinal involvement and complications including cervical dystonia. She presents to the Movement disorder clinic for re-evaluation of her dystonia.     She sees a PMR physician at [facility] (Dr. Lara) for quite a long time for this dystonia. She was referred to [facility], but the provider left and she was never seen by [facility] PMR physicians.    Seen today for re-evaluation of dystonia. She was diagnosed about 15-20 years ago with cervical dystonia in the setting of her severe scoliosis. She attempts a sensory trick of touching the chin that helps quite a bit. She notices turning head to the Right results in much more stiffness with little pain.     She gets Botox injections with Dr. Lara and was last seen on 11/10/21. During injections, does occasionally get variations in injections due to slight dynamic nature of the symptoms. She usually gets injections every 12 weeks. Historically gets ~550u, but recently got 400u and noticed no difference. In fact, noticed almost no difference at the last injection and may not notice really any relief from the day prior to injection compared to peak dose. She is having pain in the back of the neck with even minimal sustained activities such as baking and looking at the cooking sheet for a couple of minute during prep. She has a tendency to look left and compensates during relaxation. Finally, she has  shakiness of the head, usually in horizontal plane    Movement Disorder-related Medications                   [id number]  [id number]     Clonazepam 0.5mg tablet 1  1                             - Has not tried other medications for this    Review of Systems:  Other than that noted at the end of this note, the remainder of 12 systems reviewed were negative.    Medications:  Current Outpatient Medications   Medication Sig Dispense Refill     biotin 1000 MCG TABS tablet Take 1,000 mcg by mouth daily       Calcium Carbonate-Vitamin D (CALCIUM + D PO) Take 1 tablet by mouth daily       CLONAZEPAM PO Take 1-2 mg by mouth At Bedtime       LEVOTHYROXINE SODIUM PO Take 75 mcg by mouth daily       METOPROLOL TARTRATE PO Take 25 mg by mouth daily       WARFARIN SODIUM PO 10mg M and Th, 5mg for 5 days          Allergies: is allergic to oxycodone-aspirin and sulfa drugs.    Past Medical History:   No past medical history on file.    Past Surgical History:   Past Surgical History:   Procedure Laterality Date     LAMINECTOMY  15 yrs ago     VT ESOPHAGOGASTRODUODENOSCOPY TRANSORAL DIAGNOSTIC N/A 11/6/2019    Procedure: ESOPHAGOGASTRODUODENOSCOPY (EGD);  Surgeon: Wallace Cooper MD;  Location: [facility];  Service: Gastroenterology     SHOULDER ARTHROSCOPY W/ ACROMIAL REPAIR Bilateral 10 yrs ago       Social History:   Social History     Socioeconomic History     Marital status:      Spouse name: None     Number of children: None     Years of education: None     Highest education level: None   Occupational History     None   Tobacco Use     Smoking status: Former Smoker     Packs/day: 0.00     Smokeless tobacco: Never Used     Tobacco comment: quit 41 years ago   Substance and Sexual Activity     Alcohol use: None     Drug use: None     Sexual activity: None   Other Topics Concern     None   Social History Narrative     None     Social Determinants of Health     Financial Resource Strain: Not on file   Food Insecurity: Not on  "file   Transportation Needs: Not on file   Physical Activity: Not on file   Stress: Not on file   Social Connections: Not on file   Intimate Partner Violence: Not on file   Housing Stability: Not on file       Family History:  Family History   Problem Relation Age of Onset     Hypertension Mother      Coronary Artery Disease Father      Cancer Sister      No Known Problems Brother      No Known Problems Maternal Aunt      No Known Problems Maternal Uncle      No Known Problems Paternal Aunt      No Known Problems Paternal Uncle      No Known Problems Maternal Grandmother      No Known Problems Maternal Grandfather      No Known Problems Paternal Grandmother      No Known Problems Paternal Grandfather           Physical Exam:  The patient's  blood pressure is 120/66 and her pulse is 76.    Neurological Examination:   HEENT: Twisting to the Left; Right shoulder elevation and tilting with some torticollis on the Right side; Asymmetric bulk of the Left cervical rotators and some asymmetry of the Right sided shoulder elevators and contralateral rotators  Neurological  Mental status: Awake, attentive, interactive; Recalls remote and recent history with accuracy  Speech/Language: Fluent speech without paraphasic errors; No dysarthria  Cranial nerves: Visual fields intact to confrontation, Eyes conjugate, Pupils 4mm and brisk, EOMI w/ normal and smooth pursuit, face expression symmetric, facial sensation intact and symmetric, hearing intact to conversation, shoulder shrug strong but asymmetric on the Right side, palate rise symmetric, tongue/uvula midline.  Motor:   Bulk: Asymmetric bulk in the cervical musculature, especially with rotators   Tone: Increased in the cervical musculature   Spontaneous movements: Mild horizontal \"No-no\" tremor with dystonic components of erratic and inconsistent amplitudes   Power: Appropriate 5/5 in all extremities  Reflexes: 2 and symmetric biceps, triceps, brachioradialis, patellar, and " achilles. No crossed adductors or spread. Toes down-going  Sensory: Intact to LT, PP x4E; No lateral extinction   Coordination: FNF intact bilaterally without dysmetria; Normal heel-shin test bilaterally  Finger tapping with normal amplitude and frequency; Rolling hands normal rate and coordinated  Gait:  Deferred      Data Reviewed:   XR's of Scoliosis  Botox injection patterns from 11/10/21      Impression:    Rey Leslie is a 71 year old woman with PMH significant for severe scoliosis of the spine with longitudinal involvement and complications including cervical dystonia. She presents to the Movement disorder clinic for re-evaluation of her dystonia. She may have developed some resistance to Botox based on history and should be evaluated for. She is also wondering about the efficacy of Clonazepam and is wondering about a wean.     Recommendations:   1. Attempt frontalis test with PMR physician  - Inject unilaterally in frontalis muscle and have patient take a picture at 3-4 weeks to assess for asymmetry (preferably with frontalis activation)  - This can assess for Onabotulinum toxin A resistance if the asymmetry is not appreciated   - If resistance is present, consider Onabotulinum toxin B (Myobloc)    2. Continue consistent physical exercise  - Also consider PT for the cervical dystonia and limited cervical movements     3. Patient would like to attempt weaning Clonazepam to assess for efficacy  - Wean by 1/2 tablet every 7 days  - Take 1/2 tab in AM and 1 full tab in PM x7 days, then take 1/2 tab twice daily x7 days, then take 1/2 tab in the PM only x7 days, then attempt to stop  - If you develop worsening of symptoms on lower doses, then stop weaning off and keep the current medication regimen  - If we find that Clonazepam is quite effective with minimal side effects in the future, then consider adding a 1/2 tablet or 1 tablet in the afternoon/midday       - Return to movement disorder clinic as  needed    Patient seen and discussed with attending Dr. Arnold Fox MD         Attestation signed by Madonna Barrett DO at 1/22/2022  1:43 AM (Updated):    I, Madonna Barrett DO, personally saw this patient with the Neurology resident and agree with the Dr. Fox's findings and plan of care as documented in the Dr. Fox's note. I personally performed salient aspects of the history and neurological examination.     I personally reviewed the medications and labs. I personally viewed the imaging, and agree with the interpretation documented by the resident.    Key findings: Patient presents as a new patient for re-evaluation of cervical dystonia. Patient has a history of lumbar and cervical scoliosis that complicates her history of cervical dystonia. She follows with Dr. Lara, PM&R, at [facility] who treats her with onabotulinumtoxin A injections and has been doing so for years. The patient has noticed that the ontabotulinumtoxin A injections seem to be less effective as they have been in the past and is wondering about possible resistance to onabotulinumtoxin A and alternative treatment options. We discussed focal dystonias are best treated with botulinumtoxin injections (gold standard), other options are medications which are less effective and come with several side effects and surgeries, such as Deep Brain Stimulation. She is not interested in DBS. We discussed that her cervical dystonia may be difficult to treat due to her scoliosis as this presents as  a physical limitation to fully correcting her cervical alignment. To assess for resistance, we recommend the frontalis test. If she is resistant, would switch to rimabotulinumtoxin B. Encouraged PT for neck stretches and exercises. Patient also would like to try to wean off of clonazepam and a weaning schedule was provided. Patient would like to continue with Dr. Lara for management of cervical dystonia; agree with   Jane' last pattern of injections.     Date of Service (when I saw the patient): 1/19/22    Time spent with patient: 45 minutes  60 minutes spent on date of encounter doing chart reviews and exam and documentation and further activities as noted above.     Madonna Barrett DO, MA   of Neurology   [facility]               Again, thank you for allowing me to participate in the care of your patient.        Sincerely,        Madonna Barrett DO

## 2022-03-06 ENCOUNTER — HEALTH MAINTENANCE LETTER (OUTPATIENT)
Age: 72
End: 2022-03-06

## 2023-01-10 ENCOUNTER — ANCILLARY ORDERS (OUTPATIENT)
Dept: INTERNAL MEDICINE | Facility: CLINIC | Age: 73
End: 2023-01-10

## 2023-01-10 ENCOUNTER — HOSPITAL ENCOUNTER (OUTPATIENT)
Dept: BONE DENSITY | Facility: HOSPITAL | Age: 73
Discharge: HOME | End: 2023-01-10
Attending: INTERNAL MEDICINE | Admitting: INTERNAL MEDICINE
Payer: MEDICARE

## 2023-01-10 DIAGNOSIS — M81.0 OSTEOPOROSIS, UNSPECIFIED OSTEOPOROSIS TYPE, UNSPECIFIED PATHOLOGICAL FRACTURE PRESENCE: ICD-10-CM

## 2023-01-10 PROCEDURE — 77080 DXA BONE DENSITY AXIAL: CPT

## 2023-01-10 PROCEDURE — 77081 DXA BONE DENSITY APPENDICULR: CPT | Mod: XS

## 2023-04-16 ENCOUNTER — HEALTH MAINTENANCE LETTER (OUTPATIENT)
Age: 73
End: 2023-04-16

## 2023-08-11 ENCOUNTER — HOSPITAL ENCOUNTER (OUTPATIENT)
Facility: CLINIC | Age: 73
End: 2023-08-11
Attending: ORTHOPAEDIC SURGERY | Admitting: ORTHOPAEDIC SURGERY
Payer: MEDICARE

## 2023-09-20 ENCOUNTER — TRANSFERRED RECORDS (OUTPATIENT)
Dept: HEALTH INFORMATION MANAGEMENT | Facility: CLINIC | Age: 73
End: 2023-09-20
Payer: MEDICARE

## 2023-10-25 ENCOUNTER — OFFICE VISIT (OUTPATIENT)
Dept: NEUROLOGY | Facility: CLINIC | Age: 73
End: 2023-10-25
Payer: MEDICARE

## 2023-10-25 VITALS — HEART RATE: 63 BPM | DIASTOLIC BLOOD PRESSURE: 91 MMHG | SYSTOLIC BLOOD PRESSURE: 150 MMHG

## 2023-10-25 DIAGNOSIS — Z92.29 S/P BOTOX INJECTION: ICD-10-CM

## 2023-10-25 DIAGNOSIS — G24.3 CERVICAL DYSTONIA: Primary | ICD-10-CM

## 2023-10-25 PROCEDURE — 99214 OFFICE O/P EST MOD 30 MIN: CPT | Performed by: PSYCHIATRY & NEUROLOGY

## 2023-10-25 NOTE — PROGRESS NOTES
Department of Neurology  Movement Disorders Division     Patient: Rey Leslie   MRN: [id number]   : 1950   Date of Visit:  2023    History of Present Illness  Ms. Leslie is a pleasant 73 year old female that presents to Neurology Movement clinic for follow up regarding management of cervical dystonia.   Patient was last seen on 2022 as a new patient for re-evaluation of cervical dystonia. Patient has a history of lumbar and cervical scoliosis that complicates her history of cervical dystonia. She follows with Dr. Lara, PM&R, at [facility] who treats her with onabotulinumtoxin A injections and has been doing so for years. The patient has noticed that the ontabotulinumtoxin A injections seem to be less effective as they have been in the past and is wondering about possible resistance to onabotulinumtoxin A and alternative treatment options. We discussed focal dystonias are best treated with botulinumtoxin injections (gold standard), other options are medications which are less effective and come with several side effects and surgeries, such as Deep Brain Stimulation. She is not interested in DBS. We discussed that her cervical dystonia may be difficult to treat due to her scoliosis as this presents as  a physical limitation to fully correcting her cervical alignment. To assess for resistance, we recommend the frontalis test. If she is resistant, would switch to rimabotulinumtoxin B. Encouraged PT for neck stretches and exercises. Patient also would like to try to wean off of clonazepam and a weaning schedule was provided. Patient would like to continue with Dr. Lara for management of cervical dystonia; agree with Dr. Lara' last pattern of injections.     History obtained from patient.   Main complaint: cervical dystonia  Patient reports a cortisone shot to the left shoulder improved her cervical dystonia pain, dystonic tremor.   Clonazepam wean?: Currently doing this.   Frontalis  "test: Never did this.   Last received Botox injections from Dr. Lara 2 months ago and thinks her response was okay. Denies side effects to this.   She is bothered most by the dystonic tremor of her head, not so much from the dystonic positioning.   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 At home she reports a SBP of 117s.   She hates the thought of being on a controlled substance.  Movement Disorder-related Medications                   [id number]   [id number]       Clonazepam 0.5mg tablet 0.5   0.5                                                 Has been on the above regimen for a coupe months. In the past few weeks, she has cut out the PM dose on M/W/F and she reports feeling more tremor and feels \"shakey inside a little bit.\" No issues with sleeping. She reports an ache in her head/neck when she is lying down but this goes away when she gets up. She has a plan to gradually decrease the clonazepam and will observe her cervical dystonia symptoms.     Review of Systems:  Other than that mentioned above, the remainder of 12 systems reviewed were negative.    PMH: unchanged  PSH: unchanged  FH: unchanged  SH: unchanged    Medications:  Current Outpatient Medications   Medication Sig Dispense Refill    biotin 1000 MCG TABS tablet Take 1,000 mcg by mouth daily      Calcium Carbonate-Vitamin D (CALCIUM + D PO) Take 1 tablet by mouth daily      CLONAZEPAM PO Take 1-2 mg by mouth At Bedtime      LEVOTHYROXINE SODIUM PO Take 75 mcg by mouth daily      METOPROLOL TARTRATE PO Take 25 mg by mouth daily      WARFARIN SODIUM PO 10mg M and Th, 5mg for 5 days             Allergies   Allergen Reactions    Oxycodone-Aspirin Other (See Comments)     vomiting    Sulfa Antibiotics           Physical Exam:  The patient's  blood pressure is 150/91 (abnormal) and her pulse is 63.    Twisting to the Left; Right shoulder elevation and tilting with some torticollis on the Right side; Asymmetric bulk of the Left cervical " "rotators and some asymmetry of the Right sided shoulder elevators and contralateral rotators   Asymmetric bulk in the cervical musculature, especially with rotators   Tone: Increased in the cervical musculature   Spontaneous movements: Mild horizontal \"No-no\" tremor with dystonic components of erratic and inconsistent amplitudes     Impression:  Rey Leslie is a 73 year old female with cervical dystonia. The patient's main concern today is cervical dystonia. We discussed alternative options for medical treatment of cervical dystonia. She will continue to wean off of clonazepam.     Plan:  - Continue botulinum toxin injections with Dr. Lara. Consider the frontalis test with Dr. Lara to evaluate efficacy of botulinum toxin injections. Inject unilaterally in frontalis muscle and have patient take a picture at 3-4 weeks to assess for asymmetry (preferably with frontalis activation). This can assess for Onabotulinum toxin A resistance if the asymmetry is not appreciated. If resistance is present, consider Onabotulinum toxin B (Myobloc).  - Continue to decrease clonazepam 0.5 mg by 0.5 tab slowly until off. Observe your response as you decrease to either 0.5 tab qam or off.  - Patient is interested in participating in Dr. Pruitt's dystonia research study on vibrotactile stimulation. Will have Dr. Nain Alcantar, coordinator for the study, contact the patient.   - Regarding high BP value in clinic, I asked our roomer to re-evaluate Rey's BP. On repeat, BP decreased slightly but remained slightly high. Recommend Rey to discuss this with her Primary Care Provider.    Patient to return in 12 months, for in-person visit, 30 minutes.     Madonna Barrett DO, MA   of Neurology   [facility]    35 minutes spent on date of encounter doing chart reviews and exam and documentation and further activities as noted above.                  "

## 2023-10-25 NOTE — LETTER
10/25/2023         RE: Rey Leslie  [address]  [address]  [facility] [address]        Dear Colleague,    Thank you for referring your patient, Rey Leslie, to the [facility]UROLOG[facility]. Please see a copy of my visit note below.    Department of Neurology  Movement Disorders Division     Patient: Rey Leslie   MRN: [id number]   : 1950   Date of Visit:  2023    History of Present Illness  Ms. Leslie is a pleasant 73 year old female that presents to Neurology Movement clinic for follow up regarding management of cervical dystonia.   Patient was last seen on 2022 as a new patient for re-evaluation of cervical dystonia. Patient has a history of lumbar and cervical scoliosis that complicates her history of cervical dystonia. She follows with Dr. Lara, PM&R, at [facility] who treats her with onabotulinumtoxin A injections and has been doing so for years. The patient has noticed that the ontabotulinumtoxin A injections seem to be less effective as they have been in the past and is wondering about possible resistance to onabotulinumtoxin A and alternative treatment options. We discussed focal dystonias are best treated with botulinumtoxin injections (gold standard), other options are medications which are less effective and come with several side effects and surgeries, such as Deep Brain Stimulation. She is not interested in DBS. We discussed that her cervical dystonia may be difficult to treat due to her scoliosis as this presents as  a physical limitation to fully correcting her cervical alignment. To assess for resistance, we recommend the frontalis test. If she is resistant, would switch to rimabotulinumtoxin B. Encouraged PT for neck stretches and exercises. Patient also would like to try to wean off of clonazepam and a weaning schedule was provided. Patient would like to continue with Dr. Lara for management of cervical dystonia; agree with   "Jane' last pattern of injections.     History obtained from patient.   Main complaint: cervical dystonia  Patient reports a cortisone shot to the left shoulder improved her cervical dystonia pain, dystonic tremor.   Clonazepam wean?: Currently doing this.   Frontalis test: Never did this.   Last received Botox injections from Dr. Lara 2 months ago and thinks her response was okay. Denies side effects to this.   She is bothered most by the dystonic tremor of her head, not so much from the dystonic positioning.   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 At home she reports a SBP of 117s.   She hates the thought of being on a controlled substance.  Movement Disorder-related Medications                   [id number]   [id number]       Clonazepam 0.5mg tablet 0.5   0.5                                                 Has been on the above regimen for a coupe months. In the past few weeks, she has cut out the PM dose on M/W/F and she reports feeling more tremor and feels \"shakey inside a little bit.\" No issues with sleeping. She reports an ache in her head/neck when she is lying down but this goes away when she gets up. She has a plan to gradually decrease the clonazepam and will observe her cervical dystonia symptoms.     Review of Systems:  Other than that mentioned above, the remainder of 12 systems reviewed were negative.    PMH: unchanged  PSH: unchanged  FH: unchanged  SH: unchanged    Medications:  Current Outpatient Medications   Medication Sig Dispense Refill     biotin 1000 MCG TABS tablet Take 1,000 mcg by mouth daily       Calcium Carbonate-Vitamin D (CALCIUM + D PO) Take 1 tablet by mouth daily       CLONAZEPAM PO Take 1-2 mg by mouth At Bedtime       LEVOTHYROXINE SODIUM PO Take 75 mcg by mouth daily       METOPROLOL TARTRATE PO Take 25 mg by mouth daily       WARFARIN SODIUM PO 10mg M and Th, 5mg for 5 days             Allergies   Allergen Reactions     Oxycodone-Aspirin Other (See " "Comments)     vomiting     Sulfa Antibiotics           Physical Exam:  The patient's  blood pressure is 150/91 (abnormal) and her pulse is 63.    Twisting to the Left; Right shoulder elevation and tilting with some torticollis on the Right side; Asymmetric bulk of the Left cervical rotators and some asymmetry of the Right sided shoulder elevators and contralateral rotators   Asymmetric bulk in the cervical musculature, especially with rotators   Tone: Increased in the cervical musculature   Spontaneous movements: Mild horizontal \"No-no\" tremor with dystonic components of erratic and inconsistent amplitudes     Impression:  Rey Leslie is a 73 year old female with cervical dystonia. The patient's main concern today is cervical dystonia. We discussed alternative options for medical treatment of cervical dystonia. She will continue to wean off of clonazepam.     Plan:  - Continue botulinum toxin injections with Dr. Lara. Consider the frontalis test with Dr. Lara to evaluate efficacy of botulinum toxin injections. Inject unilaterally in frontalis muscle and have patient take a picture at 3-4 weeks to assess for asymmetry (preferably with frontalis activation). This can assess for Onabotulinum toxin A resistance if the asymmetry is not appreciated. If resistance is present, consider Onabotulinum toxin B (Myobloc).  - Continue to decrease clonazepam 0.5 mg by 0.5 tab slowly until off. Observe your response as you decrease to either 0.5 tab qam or off.  - Patient is interested in participating in Dr. Pruitt's dystonia research study on vibrotactile stimulation. Will have Dr. Nain Alcantar, coordinator for the study, contact the patient.   - Regarding BP in clinic, I asked our roomer to re-evaluate Rey's BP. If BP remains high, recommend Rey to discuss this with her Primary Care Provider.     Patient to return in 12 months, for in-person visit, 30 minutes.     Madonna Barrett DO, MA   " of Neurology   [facility]    35 minutes spent on date of encounter doing chart reviews and exam and documentation and further activities as noted above.                    Again, thank you for allowing me to participate in the care of your patient.        Sincerely,        Madonna Barrett DO

## 2023-10-25 NOTE — Clinical Note
Rey Thompson is interested in participating in Dr. Pruitt's dystonia research study on vibrotactile stimulation. Will have Dr. Nain Alcantar, coordinator for the study, contact the patient, please?  Thank you!  Madonna

## 2023-10-25 NOTE — PATIENT INSTRUCTIONS
Plan:  - Attempt frontalis test with Dr. Lara. Inject unilaterally in frontalis muscle and have patient take a picture at 3-4 weeks to assess for asymmetry (preferably with frontalis activation). This can assess for Onabotulinum toxin A resistance if the asymmetry is not appreciated. If resistance is present, consider Onabotulinum toxin B (Myobloc).  - Continue to decrease clonazepam 0.5 mg by 0.5 tab slowly until off. Observe your response as you decrease to either 0.5 tab qam or off.  - Patient is interested in participating in Dr. Pruitt's dystonia research study on vibrotactile stimulation. Will have Dr. Nain Alcantar, coordinator for the study, contact the patient.     Patient to return in 12 months, for in-person visit, 30 minutes.

## 2023-10-25 NOTE — Clinical Note
Hi Team, Rey's BP was high in clinic and on recheck. Can we please call the patient and make sure she is okay? It would be reassuring for her to recheck her BP to make sure it normalizes. I would like her to discuss her BP with her Primary Care Provider.   Thank you,   Madonna

## 2023-10-26 ENCOUNTER — TELEPHONE (OUTPATIENT)
Dept: NEUROLOGY | Facility: CLINIC | Age: 73
End: 2023-10-26
Payer: MEDICARE

## 2023-10-26 NOTE — TELEPHONE ENCOUNTER
Message  Received: Yesterday  Madonna Barrett,   P [facility] Clinical Care Team  Hi Team, Rey's BP was high in clinic and on recheck. Can we please call the patient and make sure she is okay? It would be reassuring for her to recheck her BP to make sure it normalizes. I would like her to discuss her BP with her Primary Care Provider.    Thank you,    NIDIA Milner Dr..    I called and spoke to the pt. Per pt said that she is doing good. Pt stated that she checks her blood pressure weekly and brings these readings into her appt with her when she goes in to see her cardiologist and PCP and that they are aware of her blood pressure. Per pt says that when she checks her blood pressure at home, it is always below 130/80. She also mentioned that when she goes into the dr's office, she gets the white coat syndrome.    Thank you.      YULIET Box on 10/26/2023 at 9:12 AM

## 2024-05-24 ENCOUNTER — ALLIED HEALTH/NURSE VISIT (OUTPATIENT)
Dept: RESEARCH | Facility: CLINIC | Age: 74
End: 2024-05-24
Payer: MEDICARE

## 2024-05-24 DIAGNOSIS — Z00.6 EXAMINATION OF PARTICIPANT OR CONTROL IN CLINICAL RESEARCH: Primary | ICD-10-CM

## 2024-05-24 PROCEDURE — 93005 ELECTROCARDIOGRAM TRACING: CPT

## 2024-05-24 PROCEDURE — 93000 ELECTROCARDIOGRAM COMPLETE: CPT | Mod: RTG | Performed by: INTERNAL MEDICINE

## 2024-05-24 PROCEDURE — 99207 PR NO CHARGE-RESEARCH SERVICE: CPT

## 2024-05-24 NOTE — PROGRESS NOTES
Study Physical Exam                   #6   Previous medical history of persistent, permanent, or chronic AF and being in AF at the time of enrollment (AF Cohort Only)    No     Patient does not fulfill study inclusion criteria and/or exclusion criteria are found. Does not meet Inclusion #6.     Electrocardiogram Interpretation:   12 Lead Interpretation: Normal Sinus Rhythm    24-MAY-2024    Jazmin Alejandra NP

## 2024-05-24 NOTE — PROGRESS NOTES
[facility] Inclusion/Exclusion Criteria:    Study Name: [facility]   : Sharee Warren MD      Study Description: The GoCoinop ECG feature is a software-only mobile medical application intended for use with the Whoop strap to create, record, store, transfer and display a single-channel electrocardiogram (ECG) qualitatively similar to a lead I ECG.     Protocol Version: 3.0 (30-Apr-2024)  Consent Version: 2.0 ( 12-Apr-2024 )    Criteria #  Inclusion Criteria (ALL MUST BE YES)  YES/NO/N/A   1  Aged 22 years or older  Yes   2 Ability to provide informed consent Yes   3 Willing to participate and to follow the procedures per the Principle Investigator's instructions Yes   4  Resides in the [place]    Yes   5   Wrist circumference: 130 mm to 245 mm at band wear position NA  Late  Note: wrist circumference measurement was not performed, secondary to subject screen failure [id number] S-B   6    Previous medical history of persistent, permanent, or chronic AF and being in AF at the time of enrollment (AF Cohort Only)    No   7     No known history of AF and being in normal sinus rhythm at the time of enrollment (SR Cohort Only) NA             Criteria # Exclusion Criteria (ALL MUST BE NO) YES/NO/N/A   1  Subjects with an implantable pacemaker device or implantable cardioverter-defibrillator device No   2 Medical history of a life-threatening cardiac arrhythmia eg., Ventricular tachycardia or fibrillation No   3  Any known allergies to medical adhesives, isopropyl alcohol, or ECG patch    No   4  Any known allergy or or sensitivity to thermoplastics, metals with PVD coatings or Elastane used in the wrist fitness device  No   5  Clinically significant body tremors that compromise study measurements    No   6  Pregnant at the time of enrollment   No   7  Any physical disability that prevents safe and adequate testing    No   8  Physical or medical impairments that preclude exercise testing, including,  but not limited to, back pain and leg claudication No   9  Mental impairment as determined by the Investigator or designee    No   10  Subjects with any medical history, physical examination finding, vital sign or other finding or assessment that could compromise subject safety, study integrity or accurate that could compromise subject safety, study integrity or accurate assessment of study objectives. This includes, but is not limited to, known untreated medical conditions that may be considered clinically significant, such as significant anemia, electrolyte imbalance, untreated or uncontrolled thyroid disease, and open wound(s) in the areas of test band positioning   No   11    Vital sign measurements, medical history of physical examination finding that makes the subject inappropriate for participation according to the investigator * No   12    Scarring, tattoos, large, pigmented moles or other skin pathology in the area of sensor location  No   13    Severe skin conditions on either wrist, that would preclude wearing the sensor. Severe symptomatic skin injury, disorder, allergy or disease such as eczema, rosacea, impetigo, dermatomyositis, or contact dermatitis on wrists or other areas where sensors or electrodes will be placed  No   14    Clinically significant hand tremors as judged by the Investigator No   15  Participated in Phase 1 of the study (Only for Phase 2 cohort)  No   * If subject is a screen fail due to PE findings, choose whichever exclusion criteria matches that finding in addition to E10.     Patient does not fulfill study inclusion criteria and/or exclusion criteria are found. Does not meet Inclusion #6.    Sharee Warren MD    24-MAY-2024    Sara Behmanesh   Clinical Research Coordinator

## 2024-05-24 NOTE — PROGRESS NOTES
"           Whoop Study Consent    Study Description: The objective of this study is to evaluate the safety and performance of the WHOOP ECG feature in adults (22 years or older) with normal sinus rhythm or diagnosed with AF.      CONSENT     Rey Leslie a 73 year old female, was on-site today to discuss participation in the Whoop Study.       The consent form was reviewed with the patient.     The review of the study included:  Study Purpose    Participant Responsibilities    Study Data and Devices    Benefits and Risks of Participation    Compensation and Costs of Participation    Voluntary Participation    Confidentiality    Injury and Legal Rights      Protocol Version: 3.0 (Version Date 30-Apr-2024)    Screening Number: SCR - ***    The subject was queried in regards to her willingness to continue and her questions were answered to her satisfaction.   The patient has given her agreement to volunteer and participate in the above noted study.   The eConsent and HIPAA form version 2.0 (Version Date 12-Apr-2024) was signed  on  ***-{Months:164678::\"MAY\"}-2024 with the Clinical Research Unit of [facility].     A copy of the consent will be placed in subject's medical record. A copy of the consent form was given to the subject today.    Study data is directly entered into Epic and [employer] Software per protocol.     No study procedures were done prior to Rey Leslie providing informed consent.     Has this subject had a previous screen failure in this study? {YES/NO -default:135401}    If yes, previous Subject Number: ***     Study Phase: Phase 2    ***-{Months:164678::\"MAY\"}-2024    Sara Behmanesh  Clinical Research Coordinator      "

## 2024-05-24 NOTE — PROGRESS NOTES
[facility] Study Visit Note    Study Description: The [facility] ECG feature is a software-only mobile medical application intended for use with the [facility] strap to create, record, store, transfer and display a single-channel electrocardiogram (ECG) qualitatively similar to a lead I ECG.    SCREENING       Demographic Info  Rey Leslie   1950          73 year old  female    Woman of Child Bearing Potential?  No   If no, Why? Over 55 years old    Race: White/  Ethnicity: Not  or        Heart Abnormalities:  History of Heart Rhythm Abnormalities? Yes Specify Below  AF paroxysmal (<7 days duration) Onset Year: 2008     Please see Screening ECG for providers rhythm interpretation.    ECG Rhythm Strip Time 09:08:26    Physical Exam Time of Assessment is equivalent to ECG time as HARJINDER is present for the ECG recording and continues their assessment thereafter.     ENROLLMENT     Subject has met all requirements and is Enrolled in the Study?: No    END OF STUDY     Any Adverse Events? No  Any Protocol Deviations? No    Any changes in medication since screening visit? No  Any device deficiencies? No If yes complete a device deficiency note    Is all study data for this visit collected? No; Will the subject return to collect missing data? No      Date Subject Exited the Study: 24-MAY-2024  Time Subject Exited the Study: 09:22:00      Did the subject successfully complete the study? No   If no, Why? Screen Fail      24-MAY-2024    Sara Behmanesh   Clinical Research Coordinator

## 2024-05-24 NOTE — PROGRESS NOTES
Whoop Study Consent    Study Description: The objective of this study is to evaluate the safety and performance of the WHOOP ECG feature in adults (22 years or older) with normal sinus rhythm or diagnosed with AF.      CONSENT     Rey Leslie a 73 year old female, was on-site today to discuss participation in the Whoop Study.       The consent form was reviewed with the patient.     The review of the study included:  Study Purpose    Participant Responsibilities    Study Data and Devices    Benefits and Risks of Participation    Compensation and Costs of Participation    Voluntary Participation    Confidentiality    Injury and Legal Rights      Protocol Version: 3.0 (Version Date 30-Apr-2024)    Screening Number: SCR - 106    The subject was queried in regards to her willingness to continue and her questions were answered to her satisfaction.   The patient has given her agreement to volunteer and participate in the above noted study.   The eConsent and HIPAA form version 2.0 (Version Date 12-Apr-2024) was signed  on  24-MAY-2024 with the Clinical Research Unit of [facility].     A copy of the consent will be placed in subject's medical record. A copy of the consent form was given to the subject today.    Study data is directly entered into Epic and [employer] per protocol.     No study procedures were done prior to Rey Leslie providing informed consent.     Has this subject had a previous screen failure in this study? No    If yes, previous Subject Number: NA     Study Phase: Phase 2    24-MAY-2024    Kandis Lopez

## 2024-05-28 LAB
ATRIAL RATE - MUSE: 65 BPM
DIASTOLIC BLOOD PRESSURE - MUSE: NORMAL MMHG
INTERPRETATION ECG - MUSE: NORMAL
P AXIS - MUSE: 72 DEGREES
PR INTERVAL - MUSE: 168 MS
QRS DURATION - MUSE: 78 MS
QT - MUSE: 414 MS
QTC - MUSE: 430 MS
R AXIS - MUSE: 21 DEGREES
SYSTOLIC BLOOD PRESSURE - MUSE: NORMAL MMHG
T AXIS - MUSE: 45 DEGREES
VENTRICULAR RATE- MUSE: 65 BPM

## 2024-05-28 PROCEDURE — 93010 ELECTROCARDIOGRAM REPORT: CPT | Mod: OFF | Performed by: INTERNAL MEDICINE

## 2024-06-23 ENCOUNTER — HEALTH MAINTENANCE LETTER (OUTPATIENT)
Age: 74
End: 2024-06-23

## 2024-10-03 ENCOUNTER — TRANSFERRED RECORDS (OUTPATIENT)
Dept: HEALTH INFORMATION MANAGEMENT | Facility: CLINIC | Age: 74
End: 2024-10-03
Payer: MEDICARE

## 2024-10-30 ENCOUNTER — OFFICE VISIT (OUTPATIENT)
Dept: NEUROLOGY | Facility: CLINIC | Age: 74
End: 2024-10-30
Payer: MEDICARE

## 2024-10-30 VITALS — SYSTOLIC BLOOD PRESSURE: 125 MMHG | DIASTOLIC BLOOD PRESSURE: 71 MMHG | HEART RATE: 61 BPM

## 2024-10-30 DIAGNOSIS — G24.3 CERVICAL DYSTONIA: Primary | ICD-10-CM

## 2024-10-30 DIAGNOSIS — Z92.29 S/P BOTOX INJECTION: ICD-10-CM

## 2024-10-30 PROCEDURE — 99215 OFFICE O/P EST HI 40 MIN: CPT | Performed by: NURSE PRACTITIONER

## 2024-10-30 NOTE — LETTER
10/30/2024      Rey Leslie  [address]  [address]  [place] [address]      Dear Colleague,    Thank you for referring your patient, Rey Leslie, to the [facility]. Please see a copy of my visit note below.    Department of Neurology  Movement Disorders Division    Patient: Rey Leslie  MRN: [id number]   : 1950   Date of Visit: 10/30/2024  PCP: Diane Solorio    CC: Cervical Dystonia    History of Present Illness  Rey Leslie is a 74 year old female with pertinent PMHx of migraine, hypothyroidism due to Hashimoto's thyroiditis, heterozygous MTHFR mutation, HTN, HLD, mitral valve prolapse, paroxysmal Afib not on anticoagulation, and cervical/lumbar scoliosis who presents to Neurology Movement clinic for ongoing management of Cervical Dystonia. She is typically a patient of Dr. Barrett. She is accompanied by her  (Mikey)    The patient was first diagnosed with cervical dystonia around 15-20 years ago in the setting of her severe scoliosis. Her head tends to turn to the R with an associated horizontal head tremor. She endorses a sensory trick of touching her chin which helps quite a bit. She had been seeing Dr. Lara in the PM&R Clinic in [facility] for Botox injections when she started to notice a lack of improvement in . This led to posterior neck pain with even minimal activity and so presented to Dr. Barrett's clinic in 2022 for re-evaluation of her dystonia. Concern arose at that time for resistance to the OnabotulinumtoxinA she had been receiving and so recommended a frontalis test with her PM&R physician.     Interval Hx:  The patient was last seen by Dr. Barrett on 10/25/2023, at which time the patient reported she had never completed the frontalis test but had undergone a cortisone shot to her L shoulder which she felt improved her cervical dystonia pain and potentially her tremor. She was again recommended to complete a frontalis test  with Dr. Lara as well as continue to wean from Clonazepam which she had been taking. The patient was also interested in participating in Dr. Pruitt's dystonia research study on vibrotactile stimulation    Today, the patient reports that her symptoms are largely the same. However, in the last 2 mo, things have gotten a bit worse since she underwent a R shoulder replacement and is engaged in PT which has been aggravating these muscles a bit. She is currently taking Clonazepam 1 mg in the morning, which helps reduce the tightness in her neck and reduces her tremor. She also continues with Botox injections which she feels are helpful, but she never performed the frontalis test. She will find herself wearing off a few weeks before her next dose is due. Her next injections are just before Thanksgiving. For the most part, her symptoms don't bother her much throughout the day     As to research, she was told the vibrotactile stimulation was no longer occurring. However, there is maybe some other new research coming up in the near future that she was called about. She is waiting for them to give her more information.    Medication Tried:   Baclofen - had sleepiness & didn't feel right.      Movement Disorder-related Medications                   AM PRN   Clonazepam (1-2 mg) 1 0.5 rarely     Review of Systems:  Other than that mentioned above, the remainder of 12 systems reviewed were negative.    Problem List:  Patient Active Problem List   Diagnosis     Scoliosis     Back pain     Osteopenia     Hypothyroidism     Chronic lumbar radiculopathy     Foreign body of esophagus, initial encounter     Abdominal bloating     AK (actinic keratosis)     Allergic rhinitis     Ascending aorta dilatation (H)     Cough     Diverticulosis     Essential hypertension     Heterozygous MTHFR mutation [id number]     Hypothyroidism due to Hashimoto's thyroiditis     Long term current use of anticoagulant therapy     Migraine     Mitral valve  prolapse     Osteoporosis     Paroxysmal atrial fibrillation (H)     Pure hypercholesterolemia     Spasmodic torticollis     Spinal stenosis in cervical region     Sprain of anterior talofibular ligament of right ankle     Tired     Weight loss       Past Surgical History:   Past Surgical History:   Procedure Laterality Date     LAMINECTOMY  15 yrs ago     [place] ESOPHAGOGASTRODUODENOSCOPY TRANSORAL DIAGNOSTIC N/A 11/6/2019    Procedure: ESOPHAGOGASTRODUODENOSCOPY (EGD);  Surgeon: Wallace Cooper MD;  Location: [facility];  Service: Gastroenterology     SHOULDER ARTHROSCOPY W/ ACROMIAL REPAIR Bilateral 10 yrs ago       Family History:   Family History   Problem Relation Age of Onset     Hypertension Mother      Coronary Artery Disease Father      Cancer Sister      No Known Problems Brother      No Known Problems Maternal Aunt      No Known Problems Maternal Uncle      No Known Problems Paternal Aunt      No Known Problems Paternal Uncle      No Known Problems Maternal Grandmother      No Known Problems Maternal Grandfather      No Known Problems Paternal Grandmother      No Known Problems Paternal Grandfather        Social History:   Social History     Tobacco Use     Smoking status: Former     Types: Cigarettes     Smokeless tobacco: Never     Tobacco comments:     quit 41 years ago       Medications:    Current Outpatient Medications:      biotin 1000 MCG TABS tablet, Take 1,000 mcg by mouth daily, Disp: , Rfl:      Calcium Carbonate-Vitamin D (CALCIUM + D PO), Take 1 tablet by mouth daily, Disp: , Rfl:      CLONAZEPAM PO, Take 1-2 mg by mouth At Bedtime, Disp: , Rfl:      LEVOTHYROXINE SODIUM PO, Take 75 mcg by mouth daily, Disp: , Rfl:      METOPROLOL TARTRATE PO, Take 25 mg by mouth daily, Disp: , Rfl:      Allergies:  Allergies   Allergen Reactions     Oxycodone-Aspirin Other (See Comments)     vomiting     Sulfa Antibiotics         Physical Exam:  /71 (BP Location: Left arm, Patient Position: Sitting,  Cuff Size: Adult Regular)   Pulse 61         10/30/2024    12:00 PM   [place] Spasmodic Torticollis Rating Scale (TWSTRS) - Severity   Telemedicine? No   Maximal Excursion - Rotation 1   Maximal Excursion - Laterocollis 2   Maximal Excursion - Anterocollis 0   Maximal Excursion - Retrocollis 0   Maximal Excursion - Lateral Shift 1   Maximal Excursion - Sagittal Shift 0   Duration Factor 5   Effect of Sensory Tricks 1   Shoulder Elevation/Anterior Displacement 1   Range of Motion 1   SEVERITY SUBTOTAL 17       Data Reviewed: I have reviewed all pertinent data    Impression:    Rey Leslie is a 74 year old female with pertinent PMHx of migraine, hypothyroidism due to Hashimoto's thyroiditis, heterozygous MTHFR mutation, HTN, HLD, mitral valve prolapse, paroxysmal Afib not on anticoagulation, and cervical/lumbar scoliosis that presents to Neurology Movement clinic for ongoing management of Cervical Dystonia. She is typically a patient of Dr. Barrett. Today, the patient feels that her current regimen is controlling her dystonia fairly well and she does not think changes need to be made. She will take Clonazepam 1 mg daily and is working with PM&R getting Botox injections. While there has been concern for tolerance to her toxin type in the past, she has never completed a frontalis test and instead happy with the relief she is getting now    Plan:   - Continue Clonazepam 1 mg daily  - Continue to work with PM&R for Botox injections  - Follow-up as needed    Patient was discussed with the Movement Disorder attending, Marly Salmeron DNP, who agrees with the above plan    Shannan Colindres MD  Movement Disorder Fellow    Pt was seen with Dr. Colindres, Fellow.  40 minutes was spent in caring for the patient. Time was spent with reviewing records, obtaining history, answering questions, examining, discussing plans, and documentation.    Marly Salmeron DNP, APRN  [facility]      Again, thank you for allowing  me to participate in the care of your patient.        Sincerely,        DAIJA Fuller CNP

## 2024-10-30 NOTE — PROGRESS NOTES
Department of Neurology  Movement Disorders Division    Patient: Rey Leslie  MRN: [id number]   : 1950   Date of Visit: 10/30/2024  PCP: Diane Solorio    CC: Cervical Dystonia    History of Present Illness  Rey Leslie is a 74 year old female with pertinent PMHx of migraine, hypothyroidism due to Hashimoto's thyroiditis, heterozygous MTHFR mutation, HTN, HLD, mitral valve prolapse, paroxysmal Afib not on anticoagulation, and cervical/lumbar scoliosis who presents to Neurology Movement clinic for ongoing management of Cervical Dystonia. She is typically a patient of Dr. Barrett. She is accompanied by her  (Mikey)    The patient was first diagnosed with cervical dystonia around 15-20 years ago in the setting of her severe scoliosis. Her head tends to turn to the R with an associated horizontal head tremor. She endorses a sensory trick of touching her chin which helps quite a bit. She had been seeing Dr. Lara in the PM&R Clinic in [facility] for Botox injections when she started to notice a lack of improvement in . This led to posterior neck pain with even minimal activity and so presented to Dr. Barrett's clinic in 2022 for re-evaluation of her dystonia. Concern arose at that time for resistance to the OnabotulinumtoxinA she had been receiving and so recommended a frontalis test with her PM&R physician.     Interval Hx:  The patient was last seen by Dr. Barrett on 10/25/2023, at which time the patient reported she had never completed the frontalis test but had undergone a cortisone shot to her L shoulder which she felt improved her cervical dystonia pain and potentially her tremor. She was again recommended to complete a frontalis test with Dr. Lara as well as continue to wean from Clonazepam which she had been taking. The patient was also interested in participating in Dr. Pruitt's dystonia research study on vibrotactile stimulation    Today, the patient reports that her symptoms are  largely the same. However, in the last 2 mo, things have gotten a bit worse since she underwent a R shoulder replacement and is engaged in PT which has been aggravating these muscles a bit. She is currently taking Clonazepam 1 mg in the morning, which helps reduce the tightness in her neck and reduces her tremor. She also continues with Botox injections which she feels are helpful, but she never performed the frontalis test. She will find herself wearing off a few weeks before her next dose is due. Her next injections are just before Thanksgiving. For the most part, her symptoms don't bother her much throughout the day     As to research, she was told the vibrotactile stimulation was no longer occurring. However, there is maybe some other new research coming up in the near future that she was called about. She is waiting for them to give her more information.    Medication Tried:   Baclofen - had sleepiness & didn't feel right.      Movement Disorder-related Medications                   AM PRN   Clonazepam (1-2 mg) 1 0.5 rarely     Review of Systems:  Other than that mentioned above, the remainder of 12 systems reviewed were negative.    Problem List:  Patient Active Problem List   Diagnosis    Scoliosis    Back pain    Osteopenia    Hypothyroidism    Chronic lumbar radiculopathy    Foreign body of esophagus, initial encounter    Abdominal bloating    AK (actinic keratosis)    Allergic rhinitis    Ascending aorta dilatation (H)    Cough    Diverticulosis    Essential hypertension    Heterozygous MTHFR mutation [id number]    Hypothyroidism due to Hashimoto's thyroiditis    Long term current use of anticoagulant therapy    Migraine    Mitral valve prolapse    Osteoporosis    Paroxysmal atrial fibrillation (H)    Pure hypercholesterolemia    Spasmodic torticollis    Spinal stenosis in cervical region    Sprain of anterior talofibular ligament of right ankle    Tired    Weight loss       Past Surgical History:   Past  Surgical History:   Procedure Laterality Date    LAMINECTOMY  15 yrs ago    ND ESOPHAGOGASTRODUODENOSCOPY TRANSORAL DIAGNOSTIC N/A 11/6/2019    Procedure: ESOPHAGOGASTRODUODENOSCOPY (EGD);  Surgeon: Wallace Cooper MD;  Location: [facility];  Service: Gastroenterology    SHOULDER ARTHROSCOPY W/ ACROMIAL REPAIR Bilateral 10 yrs ago       Family History:   Family History   Problem Relation Age of Onset    Hypertension Mother     Coronary Artery Disease Father     Cancer Sister     No Known Problems Brother     No Known Problems Maternal Aunt     No Known Problems Maternal Uncle     No Known Problems Paternal Aunt     No Known Problems Paternal Uncle     No Known Problems Maternal Grandmother     No Known Problems Maternal Grandfather     No Known Problems Paternal Grandmother     No Known Problems Paternal Grandfather        Social History:   Social History     Tobacco Use    Smoking status: Former     Types: Cigarettes    Smokeless tobacco: Never    Tobacco comments:     quit 41 years ago       Medications:    Current Outpatient Medications:     biotin 1000 MCG TABS tablet, Take 1,000 mcg by mouth daily, Disp: , Rfl:     Calcium Carbonate-Vitamin D (CALCIUM + D PO), Take 1 tablet by mouth daily, Disp: , Rfl:     CLONAZEPAM PO, Take 1-2 mg by mouth At Bedtime, Disp: , Rfl:     LEVOTHYROXINE SODIUM PO, Take 75 mcg by mouth daily, Disp: , Rfl:     METOPROLOL TARTRATE PO, Take 25 mg by mouth daily, Disp: , Rfl:      Allergies:  Allergies   Allergen Reactions    Oxycodone-Aspirin Other (See Comments)     vomiting    Sulfa Antibiotics         Physical Exam:  /71 (BP Location: Left arm, Patient Position: Sitting, Cuff Size: Adult Regular)   Pulse 61         10/30/2024    12:00 PM   [place] Spasmodic Torticollis Rating Scale (TWSTRS) - Severity   Telemedicine? No   Maximal Excursion - Rotation 1   Maximal Excursion - Laterocollis 2   Maximal Excursion - Anterocollis 0   Maximal Excursion - Retrocollis 0    Maximal Excursion - Lateral Shift 1   Maximal Excursion - Sagittal Shift 0   Duration Factor 5   Effect of Sensory Tricks 1   Shoulder Elevation/Anterior Displacement 1   Range of Motion 1   SEVERITY SUBTOTAL 17       Data Reviewed: I have reviewed all pertinent data    Impression:    Rey Leslie is a 74 year old female with pertinent PMHx of migraine, hypothyroidism due to Hashimoto's thyroiditis, heterozygous MTHFR mutation, HTN, HLD, mitral valve prolapse, paroxysmal Afib not on anticoagulation, and cervical/lumbar scoliosis that presents to Neurology Movement clinic for ongoing management of Cervical Dystonia. She is typically a patient of Dr. Barrett. Today, the patient feels that her current regimen is controlling her dystonia fairly well and she does not think changes need to be made. She will take Clonazepam 1 mg daily and is working with PM&R getting Botox injections. While there has been concern for tolerance to her toxin type in the past, she has never completed a frontalis test and instead happy with the relief she is getting now    Plan:   - Continue Clonazepam 1 mg daily  - Continue to work with PM&R for Botox injections  - Follow-up as needed    Patient was discussed with the Movement Disorder attending, Marly Salmeron DNP, who agrees with the above plan    Shannan Colindres MD  Movement Disorder Fellow    Pt was seen with Dr. Colindres, Fellow.  40 minutes was spent in caring for the patient. Time was spent with reviewing records, obtaining history, answering questions, examining, discussing plans, and documentation.    Marly Salmeron DNP, APRN  [facility]

## 2024-10-30 NOTE — NURSING NOTE
Chief Complaint   Patient presents with    Follow Up       YULIET Box on 10/30/2024 at 12:08 PM

## 2024-10-30 NOTE — PATIENT INSTRUCTIONS
Dear Ms. Rey Leslie,    Thank you for coming today.  During your visit, we have discussed the following:     - Continue on Clonazepam and Botox injections. No changes were made today    - Follow-up as needed    For questions, you may send us a [facility] message or call [phone]    Fax number: [phone]      Marly Salmeron DNP, APRN  [facility]

## 2025-01-09 ENCOUNTER — TRANSFERRED RECORDS (OUTPATIENT)
Dept: HEALTH INFORMATION MANAGEMENT | Facility: CLINIC | Age: 75
End: 2025-01-09
Payer: MEDICARE

## 2025-07-12 ENCOUNTER — HEALTH MAINTENANCE LETTER (OUTPATIENT)
Age: 75
End: 2025-07-12